# Patient Record
Sex: FEMALE | Race: WHITE | NOT HISPANIC OR LATINO | Employment: OTHER | URBAN - NONMETROPOLITAN AREA
[De-identification: names, ages, dates, MRNs, and addresses within clinical notes are randomized per-mention and may not be internally consistent; named-entity substitution may affect disease eponyms.]

---

## 2019-08-02 ENCOUNTER — OFFICE VISIT CONVERTED (OUTPATIENT)
Dept: FAMILY MEDICINE CLINIC | Age: 59
End: 2019-08-02
Attending: NURSE PRACTITIONER

## 2019-08-15 ENCOUNTER — HOSPITAL ENCOUNTER (OUTPATIENT)
Dept: OTHER | Facility: HOSPITAL | Age: 59
Discharge: HOME | End: 2019-08-15
Attending: NURSE PRACTITIONER

## 2019-08-15 LAB
ALBUMIN SERPL-MCNC: 4.3 G/DL (ref 3.5–5)
ALBUMIN/GLOB SERPL: 1.3 {RATIO} (ref 1.4–2.6)
ALP SERPL-CCNC: 97 U/L (ref 43–160)
ALT SERPL-CCNC: 23 U/L (ref 10–40)
ANION GAP SERPL CALC-SCNC: 17 MMOL/L (ref 8–19)
AST SERPL-CCNC: 24 U/L (ref 15–50)
BILIRUB SERPL-MCNC: 0.35 MG/DL (ref 0.2–1.3)
BUN SERPL-MCNC: 10 MG/DL (ref 5–25)
BUN/CREAT SERPL: 14 {RATIO} (ref 6–20)
CALCIUM SERPL-MCNC: 9.6 MG/DL (ref 8.7–10.4)
CHLORIDE SERPL-SCNC: 100 MMOL/L (ref 99–111)
CHOLEST SERPL-MCNC: 149 MG/DL (ref 107–200)
CHOLEST/HDLC SERPL: 2.9 {RATIO} (ref 3–6)
CONV CO2: 25 MMOL/L (ref 22–32)
CONV CREATININE URINE, RANDOM: 49.7 MG/DL (ref 10–300)
CONV MICROALBUM.,U,RANDOM: <12 MG/L (ref 0–20)
CONV TOTAL PROTEIN: 7.5 G/DL (ref 6.3–8.2)
CREAT UR-MCNC: 0.69 MG/DL (ref 0.5–0.9)
EST. AVERAGE GLUCOSE BLD GHB EST-MCNC: 131 MG/DL
GFR SERPLBLD BASED ON 1.73 SQ M-ARVRAT: >60 ML/MIN/{1.73_M2}
GLOBULIN UR ELPH-MCNC: 3.2 G/DL (ref 2–3.5)
GLUCOSE SERPL-MCNC: 128 MG/DL (ref 65–99)
HBA1C MFR BLD: 6.2 % (ref 3.5–5.7)
HDLC SERPL-MCNC: 51 MG/DL (ref 40–60)
LDLC SERPL CALC-MCNC: 80 MG/DL (ref 70–100)
MICROALBUMIN/CREAT UR: 24.1 MG/G{CRE} (ref 0–35)
OSMOLALITY SERPL CALC.SUM OF ELEC: 285 MOSM/KG (ref 273–304)
POTASSIUM SERPL-SCNC: 4.7 MMOL/L (ref 3.5–5.3)
SODIUM SERPL-SCNC: 137 MMOL/L (ref 135–147)
TRIGL SERPL-MCNC: 89 MG/DL (ref 40–150)
VLDLC SERPL-MCNC: 18 MG/DL (ref 5–37)

## 2020-01-30 ENCOUNTER — OFFICE VISIT CONVERTED (OUTPATIENT)
Dept: FAMILY MEDICINE CLINIC | Age: 60
End: 2020-01-30
Attending: NURSE PRACTITIONER

## 2020-02-28 ENCOUNTER — OFFICE VISIT CONVERTED (OUTPATIENT)
Dept: FAMILY MEDICINE CLINIC | Age: 60
End: 2020-02-28
Attending: NURSE PRACTITIONER

## 2020-08-26 ENCOUNTER — HOSPITAL ENCOUNTER (OUTPATIENT)
Dept: OTHER | Facility: HOSPITAL | Age: 60
Discharge: HOME | End: 2020-08-26
Attending: NURSE PRACTITIONER

## 2020-08-26 LAB
ALBUMIN SERPL-MCNC: 4.2 G/DL (ref 3.5–5)
ALBUMIN/GLOB SERPL: 1.1 {RATIO} (ref 1.4–2.6)
ALP SERPL-CCNC: 113 U/L (ref 43–160)
ALT SERPL-CCNC: 19 U/L (ref 10–40)
ANION GAP SERPL CALC-SCNC: 17 MMOL/L (ref 8–19)
AST SERPL-CCNC: 26 U/L (ref 15–50)
BASOPHILS # BLD MANUAL: 0.05 10*3/UL (ref 0–0.2)
BASOPHILS NFR BLD MANUAL: 0.6 % (ref 0–3)
BILIRUB SERPL-MCNC: 0.29 MG/DL (ref 0.2–1.3)
BUN SERPL-MCNC: 8 MG/DL (ref 5–25)
BUN/CREAT SERPL: 10 {RATIO} (ref 6–20)
CALCIUM SERPL-MCNC: 9.9 MG/DL (ref 8.7–10.4)
CHLORIDE SERPL-SCNC: 100 MMOL/L (ref 99–111)
CHOLEST SERPL-MCNC: 165 MG/DL (ref 107–200)
CHOLEST/HDLC SERPL: 3.9 {RATIO} (ref 3–6)
CONV CO2: 24 MMOL/L (ref 22–32)
CONV TOTAL PROTEIN: 8 G/DL (ref 6.3–8.2)
CREAT UR-MCNC: 0.77 MG/DL (ref 0.5–0.9)
DEPRECATED RDW RBC AUTO: 41.5 FL
EOSINOPHIL # BLD MANUAL: 0.26 10*3/UL (ref 0–0.7)
EOSINOPHIL NFR BLD MANUAL: 2.9 % (ref 0–7)
ERYTHROCYTE [DISTWIDTH] IN BLOOD BY AUTOMATED COUNT: 12.6 % (ref 11.5–14.5)
EST. AVERAGE GLUCOSE BLD GHB EST-MCNC: 148 MG/DL
GFR SERPLBLD BASED ON 1.73 SQ M-ARVRAT: >60 ML/MIN/{1.73_M2}
GLOBULIN UR ELPH-MCNC: 3.8 G/DL (ref 2–3.5)
GLUCOSE SERPL-MCNC: 127 MG/DL (ref 65–99)
GRANS (ABSOLUTE): 5.67 10*3/UL (ref 2–8)
GRANS: 63 % (ref 30–85)
HBA1C MFR BLD: 13.8 G/DL (ref 12–16)
HBA1C MFR BLD: 6.8 % (ref 3.5–5.7)
HCT VFR BLD AUTO: 41.8 % (ref 37–47)
HDLC SERPL-MCNC: 42 MG/DL (ref 40–60)
IMM GRANULOCYTES # BLD: 0.01 10*3/UL (ref 0–0.54)
IMM GRANULOCYTES NFR BLD: 0.1 % (ref 0–0.43)
LDLC SERPL CALC-MCNC: 97 MG/DL (ref 70–100)
LYMPHOCYTES # BLD MANUAL: 2.21 10*3/UL (ref 1–5)
LYMPHOCYTES NFR BLD MANUAL: 8.8 % (ref 3–10)
MCH RBC QN AUTO: 29.6 PG (ref 27–31)
MCHC RBC AUTO-ENTMCNC: 33 G/DL (ref 33–37)
MCV RBC AUTO: 89.5 FL (ref 81–99)
MONOCYTES # BLD AUTO: 0.79 10*3/UL (ref 0.2–1.2)
OSMOLALITY SERPL CALC.SUM OF ELEC: 282 MOSM/KG (ref 273–304)
PLATELET # BLD AUTO: 347 10*3/UL (ref 130–400)
PMV BLD AUTO: 10.4 FL (ref 7.4–10.4)
POTASSIUM SERPL-SCNC: 4.7 MMOL/L (ref 3.5–5.3)
RBC # BLD AUTO: 4.67 10*6/UL (ref 4.2–5.4)
SODIUM SERPL-SCNC: 136 MMOL/L (ref 135–147)
TRIGL SERPL-MCNC: 128 MG/DL (ref 40–150)
VARIANT LYMPHS NFR BLD MANUAL: 24.6 % (ref 20–45)
VLDLC SERPL-MCNC: 26 MG/DL (ref 5–37)
WBC # BLD AUTO: 8.99 10*3/UL (ref 4.8–10.8)

## 2020-08-28 ENCOUNTER — OFFICE VISIT CONVERTED (OUTPATIENT)
Dept: FAMILY MEDICINE CLINIC | Age: 60
End: 2020-08-28
Attending: NURSE PRACTITIONER

## 2021-03-04 ENCOUNTER — OFFICE VISIT CONVERTED (OUTPATIENT)
Dept: FAMILY MEDICINE CLINIC | Age: 61
End: 2021-03-04
Attending: NURSE PRACTITIONER

## 2021-05-18 NOTE — PROGRESS NOTES
Ellie Dubois  1960     Office/Outpatient Visit    Visit Date: Thu, Mar 4, 2021 09:09 am    Provider: Nae Mcghee N.P. (Assistant: Rosa Lucia MA)    Location: [facility]        Electronically signed by Nae Mcghee N.P. on  03/04/2021 09:55:36 AM                             Subjective:        CC: Ms. Dubois is a 60 year old White female.  Patient presents today for physical exam (not taking Loratadine);         HPI:           Ms. Dubois presents with encounter for general adult medical examination without abnormal findings.  Her last Pap smear no longer indicated due to history of hysterectomy.   Her last mammogram was in 3/14/16 and was normal.   She underwent in 10/12/2020 ColoGuard normal.   Preventative Health updated today.  She is not current with her influenza immunization.            PHQ-9 Depression Screening: Completed form scanned and in chart; Total Score 2 With regard to the type 2 diabetes, current meds include an oral hypoglycemic ( Glucophage XR), statin, and Ace inhibitor.  Most recent lab results include last HgbA1C 6.8 8/26/2020.  Reports fasting blood sugars on average running 100s. Reports UTD on eye exam. Will request copies. BP running 120s/80s.     In regard to the hyperlipidemia, current treatment includes Zocor.     Additionally, she presents with history of anxiety.  current treatment includes Citalopram.  Doing well. Denies suicidal ideation.    ROS:     CONSTITUTIONAL:  Negative for chills and fever.      EYES:  Negative for eye drainage and eye pain.      E/N/T:  Negative for ear pain and sore throat.      CARDIOVASCULAR:  Negative for chest pain and palpitations.      RESPIRATORY:  Negative for dyspnea and frequent wheezing.      GASTROINTESTINAL:  Negative for abdominal pain and vomiting.      MUSCULOSKELETAL:  Negative for joint stiffness and myalgias.      NEUROLOGICAL:  Negative for dizziness and headaches.      PSYCHIATRIC:  Positive for anxiety (  (stable on current medications) ).   Negative for suicidal thoughts.          Past Medical History / Family History / Social History:         Last Reviewed on 3/04/2021 09:31 AM by Nae Mcghee    Past Medical History:                 PAST MEDICAL HISTORY         Hyperlipidemia     Type 2 Diabetes     Shingles X 2     Allergies: seasonal;     Anxiety         GYNECOLOGICAL HISTORY:    G0         PREVENTIVE HEALTH MAINTENANCE             COLORECTAL CANCER SCREENING: Up to date (colonoscopy q10y; sigmoidoscopy q5y; Cologuard q3y) was last done 10/12/2020, Results are in chart; Cologuard normal     EYE EXAM: was last done 2020 [facility] [place]     Hepatitis C Medicare Screening: was last done 2019; Normal- old provider     MAMMOGRAM: was last done 3/14/16 with normal results declines, understands reason for testing     PAP SMEAR: No longer indicated due to age and history Hx hysterectomy         Surgical History:         Hysterectomy: ; total; Other Surgeries    vein ablation ;    wisdom teeth;         Family History:     Father:  at age 59; Cause of death was after CABG     Mother: Cause of death was PNA, Alzheimers         Social History:     Occupation:    Retired     Marital Status: Single         Tobacco/Alcohol/Supplements:     Last Reviewed on 3/04/2021 09:11 AM by Rosa Lucia    Tobacco: Current Smoker: She currently smokes every day, 1 pack per day.          Current Problems:     Anxiety disorder, unspecified    Type 2 diabetes mellitus without complications    Hyperlipidemia, unspecified    Encounter for screening for depression    Other specified disorders of Eustachian tube, bilateral    Essential (primary) hypertension    Rash and other nonspecific skin eruption    Encounter for general adult medical examination without abnormal findings    Acute sinusitis, unspecified    Encounter for screening for malignant neoplasm of colon    Encounter for other screening for malignant  neoplasm of breast    Nicotine dependence, unspecified, uncomplicated        Immunizations:     Td (adult), 5 Lf tetanus toxoid, preservative free, adsorbed 7/25/2015        Allergies:     Last Reviewed on 3/04/2021 09:11 AM by Rosa Lucia    Sulfonamides:          Current Medications:     Last Reviewed on 3/04/2021 09:12 AM by Rosa Lucia    citalopram 20 mg oral tablet [take 1 tablet (20 mg) by oral route once daily]    Simvastatin 20 mg oral tablet [Take one and a half tablet(s) by mouth daily]    metFORMIN 750 mg oral Tablet, Extended Release 24 hr [1 tab daily]    fluticasone propionate 50 mcg/actuation Intranasal Spray, Suspension [inhale 1 spray (50 mcg) in each nostril by intranasal route once daily]    loratadine 10 mg oral tablet [take 1 tablet (10 mg) by oral route once daily]    lisinopriL 20 mg oral tablet [take 1 tablet (20 mg) by oral route once daily]        Objective:        Vitals:         Historical:     8/28/2020  BP:   126/63 mm Hg ( (left arm, , sitting, );) 8/28/2020  P:   83bpm ( (left arm (BP Cuff), , sitting, );) 8/28/2020  Wt:   221lbs    Current: 3/4/2021 9:12:56 AM    Ht:  5 ft, 8 in;  Wt: 233.4 lbs;  BMI: 35.5T: 97.2 F (temporal);  BP: 145/54 mm Hg (left arm, sitting);  P: 56 bpm (left arm (BP Cuff), sitting);  sCr: 0.77 mg/dL;  GFR: 94.96        Repeat:     9:14:0 AM  BP:   142/48mm Hg (left arm, sitting, HR: 56)     Exams:     PHYSICAL EXAM:     GENERAL: well developed, well nourished;  no apparent distress;     EYES: PERRL, EOMI     E/N/T: EARS: external auditory canal normal;  bilateral TMs are normal;  NOSE: normal turbinates; no sinus tenderness; OROPHARYNX: oral mucosa is normal; posterior pharynx shows no exudate;     RESPIRATORY: normal respiratory rate and pattern with no distress; normal breath sounds with no rales, rhonchi, wheezes or rubs;     CARDIOVASCULAR: normal rate; rhythm is regular;     GASTROINTESTINAL: nontender; normal bowel sounds; no organomegaly;      MUSCULOSKELETAL: normal gait;     NEUROLOGIC: mental status: alert and oriented x 3; GROSSLY INTACT     PSYCHIATRIC: appropriate affect and demeanor; normal speech pattern;     Left foot exam    Protective sensation using Monofilament test: NORMAL sensation. Patient detects .07 grams of force which is considered normal.    Skin is intact without sores or ulcers    Right foot exam    Protective sensation using Monofilament test: NORMAL sensation. Patient detects .07 grams of force which is considered normal.    Skin is intact without sores or ulcers Bilateral varicosities - does not desire intervention        Assessment:         Z00.00   Encounter for general adult medical examination without abnormal findings       Z13.31   Encounter for screening for depression       E11.9   Type 2 diabetes mellitus without complications       E78.5   Hyperlipidemia, unspecified       F41.9   Anxiety disorder, unspecified       F17.200   Nicotine dependence, unspecified, uncomplicated       Z12.39   Encounter for other screening for malignant neoplasm of breast           ORDERS:         Meds Prescribed:       [Refilled] metFORMIN 750 mg oral Tablet, Extended Release 24 hr [1 tab daily], #90 (ninety) tablets, Refills: 1 (one)       [Refilled] lisinopriL 20 mg oral tablet [take 1 tablet (20 mg) by oral route once daily], #90 (ninety) tablets, Refills: 1 (one)       [Refilled] Simvastatin 20 mg oral tablet [Take one and a half tablet(s) by mouth daily], #135 (one hundred and thirty five) tablets, Refills: 1 (one)       [Refilled] citalopram 20 mg oral tablet [take 1 tablet (20 mg) by oral route once daily], #90 (ninety) tablets, Refills: 1 (one)         Radiology/Test Orders:       90621  Screening mammography bi 2-view inc CAD  (Send-Out)            3017F  Colorectal CA screen results documented and reviewed (PV)  (In-House)              Lab Orders:       10304  DIAB2 - H CMP A1C LIPID AND MICRO ALBUM CR RATIO:  [id number]  (Send-Out)            APPTO  Appointment need  (In-House)              Other Orders:         Depression screen negative  (In-House)            4004F  Pt scrnd tobacco use rcvd tobacco cessation talk  (In-House)            2028F  Foot examination performed (includes examination through visual inspection, sensory exam with monofilament, and pulse exam - report when any of the three components are completed) (DM)4  (In-House)            DLEYE  Dilated Eye Exam  (Send-Out)                      Plan:         Encounter for general adult medical examination without abnormal findingsUTD Td vaccine. Declines influenza, shingrix vaccine. UTD colorectal cancer with ColoGuard (normal). Due for mammogram. Pap smear no longer indicated due to hysterectomy.    LABORATORY:  Labs ordered to be performed today include Diabetes Panel 2;CMP, A1C, Lipid, Microalbumin:Creatinine Ratio.  MIPS Vaccines Flu and Pneumonia updated in Shot record Colorectal Cancer Screening is up to date and the results are in the chart     COUNSELING was provided today regarding the following topics: healthy eating habits, regular exercise, breast self-exam, and Advised to see dentist and eye doctor regularly.            Orders:       32578  DIAB2 - [facility] CMP A1C LIPID AND MICRO ALBUM CR RATIO: [id number]  (Send-Out)            3017F  Colorectal CA screen results documented and reviewed (PV)  (In-House)              Encounter for screening for depression    MIPS PHQ-9 Depression Screening: Completed form scanned and in chart; Total Score 2; Negative Depression Screen           Orders:         Depression screen negative  (In-House)              Type 2 diabetes mellitus without complications        RECOMMENDATIONS: instructed in use of glucometer ( check glucose daily at random intervals ), adherance to Low carb, NCS diet diet, annual monofilament test for evaluating sensation in feet, daily foot  self-inspection, annual eye exams, and need for yearly flu shots.      FOLLOW-UP: Schedule a follow-up visit in 6 months.            Prescriptions:       [Refilled] metFORMIN 750 mg oral Tablet, Extended Release 24 hr [1 tab daily], #90 (ninety) tablets, Refills: 1 (one)       [Refilled] lisinopriL 20 mg oral tablet [take 1 tablet (20 mg) by oral route once daily], #90 (ninety) tablets, Refills: 1 (one)           Orders:       DLEYE  Dilated Eye Exam  (Send-Out)            APPTO  Appointment need  (In-House)              Hyperlipidemia, unspecifiedChecking labs as above          Prescriptions:       [Refilled] Simvastatin 20 mg oral tablet [Take one and a half tablet(s) by mouth daily], #135 (one hundred and thirty five) tablets, Refills: 1 (one)         Anxiety disorder, unspecifiedStable          Prescriptions:       [Refilled] citalopram 20 mg oral tablet [take 1 tablet (20 mg) by oral route once daily], #90 (ninety) tablets, Refills: 1 (one)         Nicotine dependence, unspecified, uncomplicated        RECOMMENDATIONS given include: Counseled on smoking cessation and advised of the benefits to patient's health if she were to stop smoking. Counseling for less than 3 minutes.            Orders:       4004F  Pt scrnd tobacco use rcvd tobacco cessation talk  (In-House)              Encounter for other screening for malignant neoplasm of breast        RADIOLOGY:  I have ordered Mammogram Screening to be done today.      RECOMMENDATIONS given include: Further recommendation to be given after test results are complete.            Orders:       58084  Screening mammography bi 2-view inc CAD  (Send-Out)                  Other Orders      2028F  Foot examination performed (includes examination through visual inspection, sensory exam with monofilament, and pulse exam - report when any of the three components are completed) (DM)4  (In-House)              Patient Recommendations:        For  Encounter for general adult  medical examination without abnormal findings:        Limit dietary intake of fat (especially saturated fat) and cholesterol.  Eat a variety of foods, including plenty of fruits, vegetables, and grain containg fiber, limit fat intake to 30% of total calories. Balance caloric intake with energy expended.    Maintaining regular physical activity is advised to help prevent heart disease, hypertension, diabetes, and obesity.    You should regularly examine your breasts, easily done while in the shower or with lotion.  Feel and look for differences in consistency from month to month, especially noting knots or lumps, changes in skin appearance, nipple retraction or discharge.          For  Type 2 diabetes mellitus without complications:    Obtain instruction in the proper use of a home blood glucose monitor. Follow a diabetic diet as directed. Have an annual monofilament test to check for diabetes-related sensory nerve disturbance in the feet. Examine your feet daily.  Small cuts and injuries often go unnoticed and can lead to serious infections. Have an annual eye exam. Obtain a flu shot each year.  Schedule a follow-up visit in 6 months.          For  Nicotine dependence, unspecified, uncomplicated:        Stop smoking.              Charge Capture:         Primary Diagnosis:     Z00.00  Encounter for general adult medical examination without abnormal findings           Orders:      26119  Preventive medicine, established patient, age 40-64 years  (In-House)            3017F  Colorectal CA screen results documented and reviewed (PV)  (In-House)              Z13.31  Encounter for screening for depression           Orders:      40336-50  Office/outpatient visit; established patient, level 3  (In-House)              Depression screen negative  (In-House)              E11.9  Type 2 diabetes mellitus without complications           Orders:      APPTO  Appointment need  (In-House)              E78.5  Hyperlipidemia,  unspecified     F41.9  Anxiety disorder, unspecified     F17.200  Nicotine dependence, unspecified, uncomplicated           Orders:      4004F  Pt scrnd tobacco use rcvd tobacco cessation talk  (In-House)              Z12.39  Encounter for other screening for malignant neoplasm of breast         Other Orders:       2028F  Foot examination performed (includes examination through visual inspection, sensory exam with monofilament, and pulse exam - report when any of the three components are completed) (DM)4  (In-House)

## 2021-05-18 NOTE — PROGRESS NOTES
"Ellie Dubois 1960     Office/Outpatient Visit    Visit Date: Fri, Aug 2, 2019 12:40 pm    Provider: Nae Mcghee N.P. (Assistant: Rosa Lucia MA)    Location: [facility]        Electronically signed by Nae Mcghee N.P. on  08/02/2019 03:43:39 PM                             SUBJECTIVE:        CC:     Ms. Dubois is a 59 year old female.  Patient presents today for new patient establishment, also has complaints of rash on lower bilateral legs X 1.5 months;         HPI:         With regard to the type 2 diabetes, current meds include an oral hypoglycemic ( Glucophage XR ( 750 mg q d ) ).  Most recent lab results include Reports last HgbA1C 6.1 1/2019..  Has lost 60 pounds since her diagnosis.          In regard to the hyperlipidemia, current treatment includes Zocor.  Most recent lab tests include \"NORMAL\".          Additionally, she presents with history of anxiety.  current treatment includes Citalopram.  Doing well. Denies suicidal ideation.          Rash details; this has been a problem for the past 4 months.  It is located primarily over the left leg and the right leg.  She describes the rash as red.  The rash is pruritic and burning.  Used Lotrimin and that helped but came back 1 months ago. Also initially used OTC cream for psoriasis without improvement.      ROS:     CONSTITUTIONAL:  Negative for chills and fever.      EYES:  Negative for eye drainage and eye pain.      E/N/T:  Negative for ear pain and sore throat.      CARDIOVASCULAR:  Negative for chest pain and palpitations.      RESPIRATORY:  Negative for dyspnea and frequent wheezing.      GASTROINTESTINAL:  Negative for abdominal pain and vomiting.      INTEGUMENTARY/BREAST:  Positive for rash.      NEUROLOGICAL:  Negative for dizziness and headaches.      PSYCHIATRIC:  Negative for depression and suicidal thoughts.          PMH/FMH/SH:     Last Reviewed on 8/02/2019 01:12 PM by Nae Mcghee    Past Medical History:                "  PAST MEDICAL HISTORY         Hyperlipidemia     Type 2 Diabetes     Shingles X 2     Allergies: seasonal;     Anxiety         GYNECOLOGICAL HISTORY:    G0         PREVENTIVE HEALTH MAINTENANCE             COLORECTAL CANCER SCREENING: declines colorectal cancer screening, understands reason for testing     EYE EXAM: was last done      MAMMOGRAM: was last done 2017 with normal results [place]     PAP SMEAR: No longer indicated due to age and history Hx hysterectomy         Surgical History:         Hysterectomy: ; total; Other Surgeries    vein ablation ;    wisdom teeth;         Family History:     Father:  at age 59; Cause of death was after CABG     Mother: Cause of death was PNA, Alzheimers         Social History:     Occupation: [employer]     Marital Status: Single         Tobacco/Alcohol/Supplements:     Last Reviewed on 2019 12:44 PM by Rosa Lucia    Tobacco: Current Smoker: She currently smokes every day, 1 pack per day.              Current Problems:     Anxiety     Hyperlipidemia     Type 2 diabetes     Rash     Cigarette smoking     Screening for depression         Immunizations:     None        Allergies:     Last Reviewed on 2019 12:44 PM by Rosa Lucia    Sulfonamides:        Current Medications:     Last Reviewed on 2019 12:44 PM by Rosa Lucia    Simvastatin 20mg Tablet Take one and a half tablet(s) by mouth daily     Citalopram Hydrobromide 20mg Tablet 1 pill daily     Metformin HCl 750mg Tablets, Extended Release 1 tab daily         OBJECTIVE:        Vitals:         Current: 2019 12:45:51 PM    Ht:  5 ft, 8 in;  Wt: 222.8 lbs;  BMI: 33.9    T: 98.8 F (oral);  BP: 142/48 mm Hg (left arm, sitting);  P: 72 bpm (left arm (BP Cuff), sitting)        Repeat:     12:46:30 PM     BP:   138/49mm Hg (left arm, sitting)         Exams:     PHYSICAL EXAM:     GENERAL: well developed, well nourished;  no apparent distress;     RESPIRATORY: normal  respiratory rate and pattern with no distress; normal breath sounds with no rales, rhonchi, wheezes or rubs;     CARDIOVASCULAR: normal rate; rhythm is regular;     GASTROINTESTINAL: nontender; normal bowel sounds; no organomegaly;     BREAST/INTEGUMENT: SKIN: no significant rashes or lesions; no suspicious moles; a rash is noted on the legs;  the color is mainly red;  it is best characterized as maculopapular and excoriated;     MUSCULOSKELETAL: normal gait; normal range of motion of all major muscle groups;     NEUROLOGIC: mental status: alert and oriented x 3; GROSSLY INTACT     PSYCHIATRIC: appropriate affect and demeanor;         ASSESSMENT           250.00   E11.9  Type 2 diabetes              DDx:     272.4   E78.5  Hyperlipidemia              DDx:     300.02   F41.9  Anxiety              DDx:     782.1   R21  Rash              DDx:     V79.0   Z13.31  Screening for depression              DDx:     305.1   F17.200  Cigarette smoking              DDx:         ORDERS:         Meds Prescribed:       Clotrimazole 1% Cream APPLY  TO AREA BID  #45 (Forty Five) gm Refills: 1       Diflucan (Fluconazole) 150mg Tablet Take 1 tablet(s) by mouth on day #1, then 1 tablet on day #3  #2 (Two) tablet(s) Refills: 0       Doxycycline Monohydrate 100mg Capsules Take 1 capsule(s) by mouth bid x10 days  #20 (Twenty) capsule(s) Refills: 0       Refill of: Metformin HCl 750mg Tablets, Extended Release 1 tab daily  #90 (Ninety) tablet(s) Refills: 1       Refill of: Simvastatin 20mg Tablet Take one and a half tablet(s) by mouth daily  #135 (One [place] and Thirty Five) tablet(s) Refills: 1       Refill of: Citalopram Hydrobromide 20mg Tablet 1 pill daily  #90 (Ninety) tablet(s) Refills: 1         Lab Orders:       [id number]  DIAB2 - [facility] CMP A1C LIPID AND MICRO ALBUM CR RATIO: [id number]  (Send-Out)         APPTO  Appointment need  (In-House)           Other Orders:       4004F  Pt scrnd tobacco use rcvd tobacco  cessation talk  (In-House)           Depression screen negative  (In-House)           Negative EtOH screen  (In-House)         DLEYE  Dilated Eye Exam  (Send-Out)                   PLAN: Declines mammogram, colonoscopy, pap smear. Understands reason for testing. Due for eye exam.          Type 2 diabetes     LABORATORY:  Labs ordered to be performed today include Diabetes Panel 2;CMP, A1C, Lipid, Microalbumin:Creatinine Ratio.  MIPS Vaccines Flu and Pneumonia updated in Shot record     FOLLOW-UP: Schedule a follow-up visit in 6 months.      RECOMMENDATIONS: instructed in use of glucometer ( check glucose daily at random intervals ), adherance to Low carb, NCS diet diet, annual monofilament test for evaluating sensation in feet, daily foot self-inspection, annual eye exams, and need for yearly flu shots.            Prescriptions:       Refill of: Metformin HCl 750mg Tablets, Extended Release 1 tab daily  #90 (Ninety) tablet(s) Refills: 1           Orders:       [id number]  DIAB - [facility] CMP A1C LIPID AND MICRO ALBUM CR RATIO: [id number]  (Send-Out)         APPTO  Appointment need  (In-House)         DLEYE  Dilated Eye Exam  (Send-Out)            Hyperlipidemia Low cholesterol diet. Checking labs as above.           Prescriptions:       Refill of: Simvastatin 20mg Tablet Take one and a half tablet(s) by mouth daily  #135 (One [place] and Thirty Five) tablet(s) Refills: 1          Anxiety Stable.           Prescriptions:       Refill of: Citalopram Hydrobromide 20mg Tablet 1 pill daily  #90 (Ninety) tablet(s) Refills: 1          Rash Advised do not take Simvastatin while taking Diflucan. Avoid scratching. Follow up if no improvement.           Prescriptions:       Clotrimazole 1% Cream APPLY  TO AREA BID  #45 (Forty Five) gm Refills: 1       Diflucan (Fluconazole) 150mg Tablet Take 1 tablet(s) by mouth on day #1, then 1 tablet on day #3  #2 (Two) tablet(s) Refills: 0       Doxycycline Monohydrate  100mg Capsules Take 1 capsule(s) by mouth bid x10 days  #20 (Twenty) capsule(s) Refills: 0          Screening for depression     MIPS PHQ-9 Depression Screening: Completed form scanned and in chart; Total Score 1; Negative Depression Screen Negative alcohol screen           Orders:         Depression screen negative  (In-House)           Negative EtOH screen  (In-House)            Cigarette smoking         RECOMMENDATIONS given include: Counseled on smoking cessation and advised of the benefits to patient's health if she were to stop smoking. Counseling for less than 3 minutes.            Orders:       4004F  Pt scrnd tobacco use rcvd tobacco cessation talk  (In-House)               Patient Recommendations:        For  Type 2 diabetes:     Schedule a follow-up visit in 6 months.  Obtain instruction in the proper use of a home blood glucose monitor. Follow a diabetic diet as directed. Have an annual monofilament test to check for diabetes-related sensory nerve disturbance in the feet. Examine your feet daily.  Small cuts and injuries often go unnoticed and can lead to serious infections. Have an annual eye exam. Obtain a flu shot each year.          For  Cigarette smoking:         Stop smoking.              CHARGE CAPTURE           **Please note: ICD descriptions below are intended for billing purposes only and may not represent clinical diagnoses**        Primary Diagnosis:         250.00 Type 2 diabetes            E11.9    Type 2 diabetes mellitus without complications              Orders:          [id number]   Office visit - new pt, level 3  (In-House)             APPTO   Appointment need  (In-House)           272.4 Hyperlipidemia            E78.5    Hyperlipidemia, unspecified    300.02 Anxiety            F41.9    Anxiety disorder, unspecified    782.1 Rash            R21    Rash and other nonspecific skin eruption    V79.0 Screening for depression            Z13.31    Encounter for screening for  depression              Orders:             Depression screen negative  (In-House)                Negative EtOH screen  (In-House)           305.1 Cigarette smoking            F17.200    Nicotine dependence, unspecified, uncomplicated              Orders:          4004F   Pt scrnd tobacco use rcvd tobacco cessation talk  (In-House)

## 2021-05-18 NOTE — PROGRESS NOTES
Ellie Dubois  1960     Office/Outpatient Visit    Visit Date: Thu, Jan 30, 2020 03:00 pm    Provider: Nae Mcghee N.P. (Assistant: Rosa Lucia MA)    Location: [facility]        Electronically signed by Nae Mcghee N.P. on  02/03/2020 02:36:42 PM                             Subjective:        CC: Ms. Dubois is a 59 year old White female.  presents today due to complaints of cough, congestion and drainage X 1 month (states she is taking Citalopram, dosage unknown)         HPI:           Additionally, she presents with history of acute upper respiratory infection, unspecified.  the symptoms include dizziness, runny nose and post nasal drip.  She has not tried any medications for symptomatic relief.  Medical history is significant for diabetes.  Rash is located primarily over the left leg and the right leg.  She describes the rash as red.  The rash is pruritic and burning.  Used Lotrimin and that helped but symptoms returned. Also initially used OTC cream for psoriasis without improvement.  Was seen in August. Prescribed Clotrimazole and Doxycyline with some improvement.    ROS:     CONSTITUTIONAL:  Negative for chills and fever.      E/N/T:  Positive for ear pain, frequent rhinorrhea and post nasal drip.      CARDIOVASCULAR:  Negative for chest pain and palpitations.      RESPIRATORY:  Negative for dyspnea and frequent wheezing.      GASTROINTESTINAL:  Negative for abdominal pain and vomiting.      MUSCULOSKELETAL:  Negative for joint stiffness and myalgias.      INTEGUMENTARY/BREAST:  Positive for rash.      NEUROLOGICAL:  Negative for dizziness and headaches.      PSYCHIATRIC:  Negative for depression and suicidal thoughts.          Past Medical History / Family History / Social History:         Last Reviewed on 8/02/2019 01:12 PM by Nae Mcghee    Past Medical History:                 PAST MEDICAL HISTORY         Hyperlipidemia     Type 2 Diabetes     Shingles X 2     Allergies:  seasonal;     Anxiety         GYNECOLOGICAL HISTORY:    G0         PREVENTIVE HEALTH MAINTENANCE             COLORECTAL CANCER SCREENING: declines colorectal cancer screening, understands reason for testing     EYE EXAM: was last done      Hepatitis C Medicare Screening: was last done 2019; Normal- old provider     MAMMOGRAM: was last done 3/14/16 with normal results [place]     PAP SMEAR: No longer indicated due to age and history Hx hysterectomy         Surgical History:         Hysterectomy: ; total; Other Surgeries    vein ablation ;    wisdom teeth;         Family History:     Father:  at age 59; Cause of death was after CABG     Mother: Cause of death was PNA, Alzheimers         Social History:     Occupation: [employer]     Marital Status: Single         Tobacco/Alcohol/Supplements:     Last Reviewed on 2019 12:44 PM by Rosa Lucia    Tobacco: Current Smoker: She currently smokes every day, 1 pack per day.          Current Problems:     Type 2 diabetes mellitus without complications    Hyperlipidemia, unspecified    Anxiety disorder, unspecified    Type 2 diabetes    Hyperlipidemia    Anxiety        Immunizations:     None        Allergies:     Last Reviewed on 2019 12:44 PM by Rosa Lucia    Sulfonamides:          Current Medications:     Last Reviewed on 2019 12:44 PM by Rosa Lucia    Simvastatin 20mg Tablet [Take one and a half tablet(s) by mouth daily]        Objective:        Vitals:         Historical:     2019  BP:   138/49 mm Hg ( (left arm, , sitting, );) 2019  P:   72bpm ( (left arm (BP Cuff), , sitting, );) 2019  Wt:   222.8lbs    Current: 2020 3:10:16 PM    Ht:  5 ft, 8 in;  Wt: 229.8 lbs;  BMI: 34.9T: 98.4 F (oral);  BP: 164/67 mm Hg (left arm, sitting);  P: 78 bpm (left arm (BP Cuff), sitting);  sCr: 0.69 mg/dL;  GFR: 106.58        Repeat:     3:10:27 PM  BP:   153/69mm Hg (left arm, sitting, HR: 79)     Exams:      PHYSICAL EXAM:     GENERAL: well developed, well nourished;  no apparent distress;     EYES: PERRL, EOMI     E/N/T: EARS: external auditory canal normal;  bilateral TMs are normal;  NOSE: normal turbinates; no sinus tenderness; OROPHARYNX: oral mucosa is normal; posterior pharynx shows no exudate;     NECK: range of motion is normal; trachea is midline;     RESPIRATORY: normal respiratory rate and pattern with no distress; normal breath sounds with no rales, rhonchi, wheezes or rubs;     CARDIOVASCULAR: normal rate; rhythm is regular;     BREAST/INTEGUMENT: SKIN: no significant rashes or lesions; no suspicious moles; a rash is noted on the legs;  the color is mainly red;  it is best characterized as maculopapular and excoriated;     MUSCULOSKELETAL: normal gait; normal range of motion of all major muscle groups;     NEUROLOGIC: mental status: alert and oriented x 3; GROSSLY INTACT     PSYCHIATRIC: appropriate affect and demeanor;         Assessment:         H69.83   Other specified disorders of Eustachian tube, bilateral       I10   Essential (primary) hypertension       R21   Rash and other nonspecific skin eruption       Z13.31   Encounter for screening for depression           ORDERS:         Meds Prescribed:       [New Rx] fluticasone propionate 50 mcg/actuation Intranasal Spray, Suspension [inhale 1 spray (50 mcg) in each nostril by intranasal route once daily], #16 (sixteen) milliliters, Refills: 5 (five)       [New Rx] loratadine 10 mg oral tablet [take 1 tablet (10 mg) by oral route once daily], #30 (thirty) tablets, Refills: 5 (five)       [New Rx] predniSONE 10 mg oral tablet [take 3 tablets by oral route once daily for 5 days], #15 (fifteen) tablets, Refills: 0 (zero)       [New Rx] lisinopriL 10 mg oral tablet [take 1 tablet (10 mg) by oral route once daily], #30 (thirty) tablets, Refills: 1 (one)       [New Rx] doxycycline hyclate 100 mg oral tablet [take 1 tablet (100 mg) by oral route 2 times per day  for 10 days], #20 (twenty) tablets, Refills: 0 (zero)       [Refilled] Clotrimazole 1 % Topical Cream [APPLY  TO AREA BID], #45 (forty five) grams, Refills: 1 (one)         Other Orders:         Depression screen negative  (In-House)                      Plan:         Other specified disorders of Eustachian tube, bilateral        RECOMMENDATIONS given include: Push Fluids, Rest, Follow up if no improvement or worsening symptoms like high fevers, vomiting, weakness, or increasing shortness of air.    .            Prescriptions:       [New Rx] fluticasone propionate 50 mcg/actuation Intranasal Spray, Suspension [inhale 1 spray (50 mcg) in each nostril by intranasal route once daily], #16 (sixteen) milliliters, Refills: 5 (five)       [New Rx] loratadine 10 mg oral tablet [take 1 tablet (10 mg) by oral route once daily], #30 (thirty) tablets, Refills: 5 (five)       [New Rx] predniSONE 10 mg oral tablet [take 3 tablets by oral route once daily for 5 days], #15 (fifteen) tablets, Refills: 0 (zero)         Essential (primary) hypertensionWill add Lisinopril. BP log. Low salt diet. F/U one month.           Prescriptions:       [New Rx] lisinopriL 10 mg oral tablet [take 1 tablet (10 mg) by oral route once daily], #30 (thirty) tablets, Refills: 1 (one)         Rash and other nonspecific skin eruptionConsider additional pvd testing if no improvement.          Prescriptions:       [New Rx] doxycycline hyclate 100 mg oral tablet [take 1 tablet (100 mg) by oral route 2 times per day for 10 days], #20 (twenty) tablets, Refills: 0 (zero)       [Refilled] Clotrimazole 1 % Topical Cream [APPLY  TO AREA BID], #45 (forty five) grams, Refills: 1 (one)         Encounter for screening for depression    MIPS PHQ-9 Depression Screening: Completed form scanned and in chart; Total Score 4; Negative Depression Screen           Orders:         Depression screen negative  (In-House)                  Charge Capture:         Primary  Diagnosis:     H69.83  Other specified disorders of Eustachian tube, bilateral           Orders:      [id number]  Office/outpatient visit; established patient, level 4  (In-House)              I10  Essential (primary) hypertension     R21  Rash and other nonspecific skin eruption     Z13.31  Encounter for screening for depression           Orders:        Depression screen negative  (In-House)

## 2021-05-18 NOTE — PROGRESS NOTES
Ellie Dubois  1960     Office/Outpatient Visit    Visit Date: Fri, Feb 28, 2020 02:43 pm    Provider: Nae Mcghee N.P. (Assistant: Rosa Lucia MA)    Location: [facility]        Electronically signed by Nae Mcghee N.P. on  03/03/2020 10:56:31 AM                             Subjective:        CC: Ms. Dubois is a 59 year old White female.  Patient presents today for physical exam;         HPI: With regard to the type 2 diabetes, current meds include an oral hypoglycemic ( Glucophage XR), statin, and Ace inhibitor.  Most recent lab results include last HgbA1C 6.2 8/2019.   Has lost 60 pounds since her initial diagnosis. Does not check blood sugar on regular basis. Last eye exam 2018. Plans to make appt here in [place] soon.         In regard to the hyperlipidemia, current treatment includes Zocor.         Additionally, she presents with history of anxiety.  current treatment includes Citalopram.  Doing well. Denies suicidal ideation.        Also still with upper respiratory symptoms including runny nose, ear symptoms. Has taken 2 rounds of steroids. Also using Flonase, warm compresses. Now feels that sinus pressure has increased.          Patient to be evaluated for encounter for general adult medical examination without abnormal findings.  Her last mammogram was in 3/14/16 and was normal.   She has never had a flexible sigmoidoscopy or colonoscopy. Preventative Health updated today.  She is not current with her influenza immunization.      ROS:     CONSTITUTIONAL:  Negative for chills and fever.      EYES:  Negative for eye drainage and eye pain.      E/N/T:  Positive for ear pain, frequent rhinorrhea and sinus pressure.      CARDIOVASCULAR:  Negative for chest pain and palpitations.      RESPIRATORY:  Negative for dyspnea and frequent wheezing.      GASTROINTESTINAL:  Negative for abdominal pain and vomiting.      MUSCULOSKELETAL:  Negative for joint stiffness and myalgias.       INTEGUMENTARY/BREAST:  Negative for rash.      NEUROLOGICAL:  Negative for dizziness and headaches.      PSYCHIATRIC:  Negative for depression and suicidal thoughts.          Past Medical History / Family History / Social History:         Last Reviewed on 2019 01:12 PM by Nae Mcghee    Past Medical History:                 PAST MEDICAL HISTORY         Hyperlipidemia     Type 2 Diabetes     Shingles X 2     Allergies: seasonal;     Anxiety         GYNECOLOGICAL HISTORY:    G0         PREVENTIVE HEALTH MAINTENANCE             COLORECTAL CANCER SCREENING: declines colorectal cancer screening, understands reason for testing     EYE EXAM: was last done      Hepatitis C Medicare Screening: was last done 2019; Normal- old provider     MAMMOGRAM: was last done 3/14/16 with normal results [place]     PAP SMEAR: No longer indicated due to age and history Hx hysterectomy         Surgical History:         Hysterectomy: ; total; Other Surgeries    vein ablation ;    wisdom teeth;         Family History:     Father:  at age 59; Cause of death was after CABG     Mother: Cause of death was PNA, Alzheimers         Social History:     Occupation: [employer]     Marital Status: Single         Tobacco/Alcohol/Supplements:     Last Reviewed on 2020 03:08 PM by Rosa Lucia    Tobacco: Current Smoker: She currently smokes every day, 1 pack per day.          Current Problems:     Type 2 diabetes mellitus without complications    Hyperlipidemia, unspecified    Anxiety disorder, unspecified    Type 2 diabetes    Hyperlipidemia    Anxiety    Other specified disorders of Eustachian tube, bilateral    Essential (primary) hypertension    Rash and other nonspecific skin eruption    Encounter for screening for depression        Immunizations:     None        Allergies:     Last Reviewed on 2020 03:08 PM by Rosa Lucia    Sulfonamides:          Current Medications:     Last Reviewed on  1/30/2020 03:08 PM by Rosa Lucia    metFORMIN 750 mg oral Tablet, Extended Release 24 hr [take 1 tablet (750 mg) by oral route once daily with the evening meal]    citalopram     Simvastatin 20 mg oral tablet [Take one and a half tablet(s) by mouth daily]    Clotrimazole 1 % Topical Cream [APPLY  TO AREA BID]    fluticasone propionate 50 mcg/actuation Intranasal Spray, Suspension [inhale 1 spray (50 mcg) in each nostril by intranasal route once daily]    loratadine 10 mg oral tablet [take 1 tablet (10 mg) by oral route once daily]    predniSONE 10 mg oral tablet [take 3 tablets by oral route once daily for 5 days]    lisinopriL 10 mg oral tablet [take 1 tablet (10 mg) by oral route once daily]    doxycycline hyclate 100 mg oral tablet [take 1 tablet (100 mg) by oral route 2 times per day for 10 days]    predniSONE 10 mg oral tablet [take 3 tablets by oral route once daily for 3 days]        Objective:        Vitals:         Historical:     1/30/2020  BP:   153/69 mm Hg ( (left arm, , sitting, );) 8/2/2019  BP:   138/49 mm Hg ( (left arm, , sitting, );) 1/30/2020  P:   78bpm ( (left arm (BP Cuff), , sitting, );) 8/2/2019  P:   72bpm ( (left arm (BP Cuff), , sitting, );) 1/30/2020  Wt:   229.8lbs    Current: 2/28/2020 2:48:10 PM    Ht:  5 ft, 8 in;  Wt: 227 lbs;  BMI: 34.5T: 97.7 F (oral);  BP: 172/73 mm Hg (left arm, sitting);  P: 63 bpm (left arm (BP Cuff), sitting);  sCr: 0.69 mg/dL;  GFR: 106.03        Repeat:     2:49:5 PM  BP:   174/77mm Hg (right arm, sitting, HR: 65)     Exams:     PHYSICAL EXAM:     GENERAL: well developed, well nourished;  no apparent distress;     EYES: PERRL, EOMI     E/N/T: EARS: external auditory canal normal;  both TMs are dull;  NOSE: normal turbinates; bilateral maxillary and bilateral frontal sinus tenderness present; OROPHARYNX: oral mucosa is normal; posterior pharynx shows no exudate and post nasal drip;     NECK: range of motion is normal; trachea is midline;      RESPIRATORY: normal appearance and symmetric expansion of chest wall; normal respiratory rate and pattern with no distress; normal breath sounds with no rales, rhonchi, wheezes or rubs;     CARDIOVASCULAR: normal rate; rhythm is regular;     GASTROINTESTINAL: nontender; normal bowel sounds; no organomegaly;     BREAST/INTEGUMENT: SKIN: no significant rashes or lesions; no suspicious moles;     MUSCULOSKELETAL: normal gait; normal range of motion of all major muscle groups; no limb or joint pain with range of motion;     NEUROLOGIC: mental status: alert and oriented x 3; GROSSLY INTACT     PSYCHIATRIC: appropriate affect and demeanor; normal thought and perception;         Assessment:         Z00.00   Encounter for general adult medical examination without abnormal findings       E11.9   Type 2 diabetes mellitus without complications       E78.5   Hyperlipidemia, unspecified       I10   Essential (primary) hypertension       F41.9   Anxiety disorder, unspecified       J01.90   Acute sinusitis, unspecified       Z13.31   Encounter for screening for depression           ORDERS:         Meds Prescribed:       [New Rx] lisinopriL 20 mg oral tablet [take 1 tablet (20 mg) by oral route once daily], #90 (ninety) tablets, Refills: 1 (one)       [Refilled] Simvastatin 20 mg oral tablet [Take one and a half tablet(s) by mouth daily], #135 (one hundred and thirty five) tablets, Refills: 1 (one)       [Refilled] citalopram 20 mg oral tablet [take 1 tablet (20 mg) by oral route once daily], #90 (ninety) tablets, Refills: 1 (one)       [Refilled] metFORMIN 750 mg oral Tablet, Extended Release 24 hr [1 tab daily], #90 (ninety) tablets, Refills: 1 (one)       [New Rx] amoxicillin 875 mg oral tablet [take 1 tablet (875 mg) by oral route every 12 hours X 10 days], #20 (twenty) tablets, Refills: 0 (zero)         Lab Orders:       [id number]  [facility] CBC with 3 part diff  (Send-Out)            05931  COMP - [facility] Comp. Metabolic Panel   (Send-Out)            38763  A1CEG - [facility] Hemoglobin A1C  (Send-Out)            10269  LPDP [facility] Lipid Panel  (Send-Out)            APPTO  Appointment need  (In-House)              Other Orders:         Depression screen negative  (In-House)            DLEYE  Dilated Eye Exam  (Send-Out)                      Plan:         Encounter for general adult medical examination without abnormal findingsDeclines mammogram, colonoscopy, influenza, pneumonia vaccien. Understands reason for testing/recommendation    LABORATORY:  Labs ordered to be performed today include CBC, Comprehensive metabolic panel, HgbA1C, and lipid panel.      COUNSELING was provided today regarding the following topics: healthy eating habits, regular exercise, and Advised to see eye doctor and dentist regularly.            Orders:       [id number]  BDCBC - [facility] CBC with 3 part diff  (Send-Out)            83470  COMP - [facility] Comp. Metabolic Panel  (Send-Out)            [facility]  [facility]            06246  LPDP - [facility] Lipid Panel  (Send-Out)              Type 2 diabetes mellitus without complicationsDue for eye exam. Plans to schedule        FOLLOW-UP: Schedule a follow-up visit in 6 months.      RECOMMENDATIONS: instructed in use of glucometer ( check glucose daily at random intervals ), adherance to Low carb, NCS diet diet, annual monofilament test for evaluating sensation in feet, daily foot self-inspection, annual eye exams, and need for yearly flu shots.            Prescriptions:       [Refilled] metFORMIN 750 mg oral Tablet, Extended Release 24 hr [1 tab daily], #90 (ninety) tablets, Refills: 1 (one)           Orders:       APPTO  Appointment need  (In-House)            DLEYE  Dilated Eye Exam  (Send-Out)              Hyperlipidemia, unspecified        RECOMMENDATIONS given include: low cholesterol/low fat diet.            Prescriptions:       [Refilled] Simvastatin 20 mg oral tablet [Take one and a half tablet(s) by mouth  daily], #135 (one hundred and thirty five) tablets, Refills: 1 (one)         Essential (primary) hypertensionWill increase Lisinopril to 20 mg daily. BP log. Low salt diet.          Prescriptions:       [New Rx] lisinopriL 20 mg oral tablet [take 1 tablet (20 mg) by oral route once daily], #90 (ninety) tablets, Refills: 1 (one)         Anxiety disorder, unspecified          Prescriptions:       [Refilled] citalopram 20 mg oral tablet [take 1 tablet (20 mg) by oral route once daily], #90 (ninety) tablets, Refills: 1 (one)         Acute sinusitis, unspecified        RECOMMENDATIONS given include: Push Fluids, Rest, Follow up if no improvement or worsening symptoms like high fevers, vomiting, weakness, or increasing shortness of air.    .            Prescriptions:       [New Rx] amoxicillin 875 mg oral tablet [take 1 tablet (875 mg) by oral route every 12 hours X 10 days], #20 (twenty) tablets, Refills: 0 (zero)         Encounter for screening for depression    MIPS PHQ-9 Depression Screening: Completed form scanned and in chart; Total Score 1; Negative Depression Screen           Orders:         Depression screen negative  (In-House)                  Patient Recommendations:        For  Encounter for general adult medical examination without abnormal findings:        Limit dietary intake of fat (especially saturated fat) and cholesterol.  Eat a variety of foods, including plenty of fruits, vegetables, and grain containg fiber, limit fat intake to 30% of total calories. Balance caloric intake with energy expended.    Maintaining regular physical activity is advised to help prevent heart disease, hypertension, diabetes, and obesity.          For  Type 2 diabetes mellitus without complications:    Schedule a follow-up visit in 6 months.  Obtain instruction in the proper use of a home blood glucose monitor. Follow a diabetic diet as directed. Have an annual monofilament test to check for diabetes-related sensory  nerve disturbance in the feet. Examine your feet daily.  Small cuts and injuries often go unnoticed and can lead to serious infections. Have an annual eye exam. Obtain a flu shot each year.          For  Hyperlipidemia, unspecified:    Reduce the amount of cholesterol and saturated fat in your diet.              Charge Capture:         Primary Diagnosis:     Z00.00  Encounter for general adult medical examination without abnormal findings           Orders:      [id number]  Preventive medicine, established patient, age 40-64 years  (In-House)              E11.9  Type 2 diabetes mellitus without complications           Orders:      84223-13  Office/outpatient visit; established patient, level 4  (In-House)            APPTO  Appointment need  (In-House)              E78.5  Hyperlipidemia, unspecified     I10  Essential (primary) hypertension     F41.9  Anxiety disorder, unspecified     J01.90  Acute sinusitis, unspecified     Z13.31  Encounter for screening for depression           Orders:        Depression screen negative  (In-House)

## 2021-05-18 NOTE — PROGRESS NOTES
Ellie Dubois  1960     Office/Outpatient Visit    Visit Date: Fri, Aug 28, 2020 12:58 pm    Provider: Nae Mcghee N.P. (Assistant: Madan Corral, )    Location: [facility]        Electronically signed by Nae Mcghee N.P. on  08/28/2020 01:51:53 PM                             Subjective:        CC: Ms. Dubois is a 60 year old White female.  med refills;         HPI: With regard to the type 2 diabetes, current meds include an oral hypoglycemic ( Glucophage XR), statin, and Ace inhibitor.  Most recent lab results include last HgbA1C 6.8 8/26/2020.   Has lost 60 pounds since her initial diagnosis. Does not check blood sugar on regular basis. Last eye exam 2018. Plans to make appt soon.         In regard to the hyperlipidemia, current treatment includes Zocor.         Additionally, she presents with history of anxiety.  current treatment includes Citalopram.  Doing well. Denies suicidal ideation.    ROS:     CONSTITUTIONAL:  Negative for chills and fever.      CARDIOVASCULAR:  Negative for chest pain and palpitations.      RESPIRATORY:  Negative for dyspnea and frequent wheezing.      GASTROINTESTINAL:  Negative for abdominal pain and vomiting.      NEUROLOGICAL:  Negative for dizziness and headaches.      PSYCHIATRIC:  Negative for depression and suicidal thoughts.          Past Medical History / Family History / Social History:         Last Reviewed on 8/02/2019 01:12 PM by Nae Mcghee    Past Medical History:                 PAST MEDICAL HISTORY         Hyperlipidemia     Type 2 Diabetes     Shingles X 2     Allergies: seasonal;     Anxiety         GYNECOLOGICAL HISTORY:             PREVENTIVE HEALTH MAINTENANCE             COLORECTAL CANCER SCREENING: declines colorectal cancer screening, understands reason for testing     EYE EXAM: was last done 2018     Hepatitis C Medicare Screening: was last done 1/8/2019; Normal- old provider     MAMMOGRAM: was last done 3/14/16 with normal results  declines, understands reason for testing     PAP SMEAR: No longer indicated due to age and history Hx hysterectomy         Surgical History:         Hysterectomy: ; total; Other Surgeries    vein ablation ;    wisdom teeth;         Family History:     Father:  at age 59; Cause of death was after CABG     Mother: Cause of death was PNA, Alzheimers         Social History:     Occupation: [employer]     Marital Status: Single         Tobacco/Alcohol/Supplements:     Last Reviewed on 2020 02:46 PM by Rosa Lucia    Tobacco: Current Smoker: She currently smokes every day, 1 pack per day.          Current Problems:     Type 2 diabetes mellitus without complications    Hyperlipidemia, unspecified    Anxiety disorder, unspecified    Other specified disorders of Eustachian tube, bilateral    Essential (primary) hypertension    Rash and other nonspecific skin eruption    Encounter for screening for depression    Encounter for general adult medical examination without abnormal findings    Acute sinusitis, unspecified        Immunizations:     None        Allergies:     Last Reviewed on 2020 02:46 PM by Rosa Lucia    Sulfonamides:          Current Medications:     Last Reviewed on 2020 02:46 PM by Rosa Lucia    Clotrimazole 1 % Topical Cream [APPLY  TO AREA BID]    Simvastatin 20 mg oral tablet [Take one and a half tablet(s) by mouth daily]    citalopram 20 mg oral tablet [take 1 tablet (20 mg) by oral route once daily]    metFORMIN 750 mg oral Tablet, Extended Release 24 hr [1 tab daily]    fluticasone propionate 50 mcg/actuation Intranasal Spray, Suspension [inhale 1 spray (50 mcg) in each nostril by intranasal route once daily]    loratadine 10 mg oral tablet [take 1 tablet (10 mg) by oral route once daily]    lisinopriL 20 mg oral tablet [take 1 tablet (20 mg) by oral route once daily]    amoxicillin 875 mg oral tablet [take 1 tablet (875 mg) by oral route every 12  hours X 10 days]        Objective:        Vitals:         Historical:     2/28/2020  BP:   174/77 mm Hg ( (right arm, , sitting, );) 2/28/2020  P:   63bpm ( (left arm (BP Cuff), , sitting, );) 2/28/2020  Wt:   227lbs    Current: 8/28/2020 1:03:32 PM    Ht:  5 ft, 8 in;  Wt: 221 lbs;  BMI: 33.6T: 97.9 F (temporal);  BP: 126/63 mm Hg (left arm, sitting);  P: 83 bpm (left arm (BP Cuff), sitting);  sCr: 0.77 mg/dL;  GFR: 92.79        Exams:     PHYSICAL EXAM:     GENERAL: well developed, well nourished;  no apparent distress;     RESPIRATORY: normal respiratory rate and pattern with no distress; normal breath sounds with no rales, rhonchi, wheezes or rubs;     CARDIOVASCULAR: normal rate; rhythm is regular;     NEUROLOGIC: mental status: alert and oriented x 3; GROSSLY INTACT     PSYCHIATRIC: appropriate affect and demeanor;     Left foot exam    Protective sensation using Monofilament test: NORMAL sensation. Patient detects .07 grams of force which is considered normal.    Vascular status: normal peripheral vascular exam with palpable dorsal pedal and posterior tibal pulses and brisk digital capillary refill    Skin is intact without sores or ulcers    Right foot exam    Protective sensation using Monofilament test: NORMAL sensation. Patient detects .07 grams of force which is considered normal.    Vascular status: normal peripheral vascular exam with palpable dorsal pedal and posterior tibal pulses and brisk digital capillary refill    Skin is intact without sores or ulcers         Assessment:         E11.9   Type 2 diabetes mellitus without complications       I10   Essential (primary) hypertension       E78.5   Hyperlipidemia, unspecified       F41.9   Anxiety disorder, unspecified       Z12.11   Encounter for screening for malignant neoplasm of colon       Z12.39   Encounter for other screening for malignant neoplasm of breast           ORDERS:         Meds Prescribed:       [Refilled] metFORMIN 750 mg oral Tablet,  Extended Release 24 hr [1 tab daily], #90 (ninety) tablets, Refills: 1 (one)       [Refilled] lisinopriL 20 mg oral tablet [take 1 tablet (20 mg) by oral route once daily], #90 (ninety) tablets, Refills: 1 (one)       [Refilled] Simvastatin 20 mg oral tablet [Take one and a half tablet(s) by mouth daily], #135 (one hundred and thirty five) tablets, Refills: 1 (one)       [Refilled] citalopram 20 mg oral tablet [take 1 tablet (20 mg) by oral route once daily], #90 (ninety) tablets, Refills: 1 (one)         Radiology/Test Orders:       [id number]  Screening mammography bi 2-view inc CAD  (Send-Out)              Lab Orders:       [id number]  Oncology colorectal screening alejandro 10 DNA markers  (Send-Out)            APPTO  Appointment need  (In-House)              Other Orders:       2028F  Foot examination performed (includes examination through visual inspection, sensory exam with monofilament, and pulse exam - report when any of the three components are completed) (DM)4  (In-House)            DLEYE  Dilated Eye Exam  (Send-Out)                      Plan:         Type 2 diabetes mellitus without complications        FOLLOW-UP: Schedule a follow-up visit in 6 months.      RECOMMENDATIONS: instructed in use of glucometer ( check glucose daily at random intervals ), adherance to Low carb, NCS diet diet, annual monofilament test for evaluating sensation in feet, daily foot self-inspection, annual eye exams, and need for yearly flu shots.            Prescriptions:       [Refilled] metFORMIN 750 mg oral Tablet, Extended Release 24 hr [1 tab daily], #90 (ninety) tablets, Refills: 1 (one)           Orders:       APPTO  Appointment need  (In-House)            DLEYE  Dilated Eye Exam  (Send-Out)              Essential (primary) hypertensionStable          Prescriptions:       [Refilled] lisinopriL 20 mg oral tablet [take 1 tablet (20 mg) by oral route once daily], #90 (ninety) tablets, Refills: 1 (one)         Hyperlipidemia,  unspecifiedStable          Prescriptions:       [Refilled] Simvastatin 20 mg oral tablet [Take one and a half tablet(s) by mouth daily], #135 (one hundred and thirty five) tablets, Refills: 1 (one)         Anxiety disorder, unspecifiedStable          Prescriptions:       [Refilled] citalopram 20 mg oral tablet [take 1 tablet (20 mg) by oral route once daily], #90 (ninety) tablets, Refills: 1 (one)         Encounter for screening for malignant neoplasm of colon        TESTS/PROCEDURES:  Will proceed with Cologuard to be performed/scheduled now.            Orders:       [id number]  Oncology colorectal screening alejandro 10 DNA markers  (Send-Out)              Encounter for other screening for malignant neoplasm of breast        RADIOLOGY:  I have ordered Mammogram Screening to be done today.            Orders:       [id number]  Screening mammography bi 2-view inc CAD  (Send-Out)                  Other Orders      2028F  Foot examination performed (includes examination through visual inspection, sensory exam with monofilament, and pulse exam - report when any of the three components are completed) (DM)4  (In-House)              Patient Recommendations:        For  Type 2 diabetes mellitus without complications:    Schedule a follow-up visit in 6 months.  Obtain instruction in the proper use of a home blood glucose monitor. Follow a diabetic diet as directed. Have an annual monofilament test to check for diabetes-related sensory nerve disturbance in the feet. Examine your feet daily.  Small cuts and injuries often go unnoticed and can lead to serious infections. Have an annual eye exam. Obtain a flu shot each year.              Charge Capture:         Primary Diagnosis:     E11.9  Type 2 diabetes mellitus without complications           Orders:      [id number]  Office/outpatient visit; established patient, level 4  (In-House)            APPTO  Appointment need  (In-House)              I10  Essential (primary) hypertension     E78.5   Hyperlipidemia, unspecified     F41.9  Anxiety disorder, unspecified     Z12.11  Encounter for screening for malignant neoplasm of colon     Z12.39  Encounter for other screening for malignant neoplasm of breast         Other Orders:       2028F  Foot examination performed (includes examination through visual inspection, sensory exam with monofilament, and pulse exam - report when any of the three components are completed) (DM)4  (In-House)

## 2021-07-01 VITALS
SYSTOLIC BLOOD PRESSURE: 138 MMHG | HEART RATE: 72 BPM | DIASTOLIC BLOOD PRESSURE: 49 MMHG | TEMPERATURE: 98.8 F | BODY MASS INDEX: 33.77 KG/M2 | WEIGHT: 222.8 LBS | HEIGHT: 68 IN

## 2021-07-02 VITALS
WEIGHT: 229.8 LBS | BODY MASS INDEX: 34.83 KG/M2 | HEART RATE: 78 BPM | SYSTOLIC BLOOD PRESSURE: 153 MMHG | TEMPERATURE: 98.4 F | DIASTOLIC BLOOD PRESSURE: 69 MMHG | HEIGHT: 68 IN

## 2021-07-02 VITALS
WEIGHT: 227 LBS | SYSTOLIC BLOOD PRESSURE: 174 MMHG | HEART RATE: 63 BPM | HEIGHT: 68 IN | TEMPERATURE: 97.7 F | BODY MASS INDEX: 34.4 KG/M2 | DIASTOLIC BLOOD PRESSURE: 77 MMHG

## 2021-07-02 VITALS
HEIGHT: 68 IN | TEMPERATURE: 97.2 F | HEART RATE: 56 BPM | SYSTOLIC BLOOD PRESSURE: 142 MMHG | DIASTOLIC BLOOD PRESSURE: 48 MMHG | WEIGHT: 233.4 LBS | BODY MASS INDEX: 35.37 KG/M2

## 2021-07-02 VITALS
WEIGHT: 221 LBS | HEIGHT: 68 IN | HEART RATE: 83 BPM | SYSTOLIC BLOOD PRESSURE: 126 MMHG | TEMPERATURE: 97.9 F | BODY MASS INDEX: 33.49 KG/M2 | DIASTOLIC BLOOD PRESSURE: 63 MMHG

## 2021-09-08 ENCOUNTER — LAB (OUTPATIENT)
Dept: LAB | Facility: HOSPITAL | Age: 61
End: 2021-09-08

## 2021-09-08 ENCOUNTER — OFFICE VISIT (OUTPATIENT)
Dept: FAMILY MEDICINE CLINIC | Age: 61
End: 2021-09-08

## 2021-09-08 VITALS
WEIGHT: 217.4 LBS | SYSTOLIC BLOOD PRESSURE: 147 MMHG | HEIGHT: 68 IN | HEART RATE: 65 BPM | DIASTOLIC BLOOD PRESSURE: 78 MMHG | TEMPERATURE: 98.2 F | BODY MASS INDEX: 32.95 KG/M2 | OXYGEN SATURATION: 96 %

## 2021-09-08 DIAGNOSIS — Z11.59 SCREENING FOR VIRAL DISEASE: ICD-10-CM

## 2021-09-08 DIAGNOSIS — H69.80 DYSFUNCTION OF EUSTACHIAN TUBE, UNSPECIFIED LATERALITY: ICD-10-CM

## 2021-09-08 DIAGNOSIS — F17.210 CIGARETTE NICOTINE DEPENDENCE WITHOUT COMPLICATION: ICD-10-CM

## 2021-09-08 DIAGNOSIS — F41.1 GENERALIZED ANXIETY DISORDER: ICD-10-CM

## 2021-09-08 DIAGNOSIS — E78.2 MIXED HYPERLIPIDEMIA: ICD-10-CM

## 2021-09-08 DIAGNOSIS — E11.9 TYPE 2 DIABETES MELLITUS WITHOUT COMPLICATION, WITHOUT LONG-TERM CURRENT USE OF INSULIN (HCC): ICD-10-CM

## 2021-09-08 DIAGNOSIS — E11.9 TYPE 2 DIABETES MELLITUS WITHOUT COMPLICATION, WITHOUT LONG-TERM CURRENT USE OF INSULIN (HCC): Primary | ICD-10-CM

## 2021-09-08 LAB
ALBUMIN SERPL-MCNC: 4.1 G/DL (ref 3.5–5.2)
ALBUMIN UR-MCNC: 4 MG/DL
ALBUMIN/GLOB SERPL: 1.1 G/DL
ALP SERPL-CCNC: 140 U/L (ref 39–117)
ALT SERPL W P-5'-P-CCNC: 22 U/L (ref 1–33)
ANION GAP SERPL CALCULATED.3IONS-SCNC: 10.6 MMOL/L (ref 5–15)
AST SERPL-CCNC: 24 U/L (ref 1–32)
BASOPHILS # BLD AUTO: 0.06 10*3/MM3 (ref 0–0.2)
BASOPHILS NFR BLD AUTO: 0.7 % (ref 0–1.5)
BILIRUB SERPL-MCNC: 0.4 MG/DL (ref 0–1.2)
BUN SERPL-MCNC: 8 MG/DL (ref 8–23)
BUN/CREAT SERPL: 11.3 (ref 7–25)
CALCIUM SPEC-SCNC: 9.6 MG/DL (ref 8.6–10.5)
CHLORIDE SERPL-SCNC: 96 MMOL/L (ref 98–107)
CHOLEST SERPL-MCNC: 177 MG/DL (ref 0–200)
CO2 SERPL-SCNC: 28.4 MMOL/L (ref 22–29)
CREAT SERPL-MCNC: 0.71 MG/DL (ref 0.57–1)
CREAT UR-MCNC: 37.5 MG/DL
DEPRECATED RDW RBC AUTO: 41 FL (ref 37–54)
EOSINOPHIL # BLD AUTO: 0.27 10*3/MM3 (ref 0–0.4)
EOSINOPHIL NFR BLD AUTO: 3.3 % (ref 0.3–6.2)
ERYTHROCYTE [DISTWIDTH] IN BLOOD BY AUTOMATED COUNT: 12.5 % (ref 12.3–15.4)
GFR SERPL CREATININE-BSD FRML MDRD: 83 ML/MIN/1.73
GLOBULIN UR ELPH-MCNC: 3.6 GM/DL
GLUCOSE SERPL-MCNC: 122 MG/DL (ref 65–99)
HBA1C MFR BLD: 6.64 % (ref 4.8–5.6)
HCT VFR BLD AUTO: 45.2 % (ref 34–46.6)
HCV AB SER DONR QL: NORMAL
HDLC SERPL-MCNC: 51 MG/DL (ref 40–60)
HGB BLD-MCNC: 15.3 G/DL (ref 12–15.9)
IMM GRANULOCYTES # BLD AUTO: 0.01 10*3/MM3 (ref 0–0.05)
IMM GRANULOCYTES NFR BLD AUTO: 0.1 % (ref 0–0.5)
LDLC SERPL CALC-MCNC: 105 MG/DL (ref 0–100)
LDLC/HDLC SERPL: 2 {RATIO}
LYMPHOCYTES # BLD AUTO: 2.12 10*3/MM3 (ref 0.7–3.1)
LYMPHOCYTES NFR BLD AUTO: 26 % (ref 19.6–45.3)
MCH RBC QN AUTO: 30.2 PG (ref 26.6–33)
MCHC RBC AUTO-ENTMCNC: 33.8 G/DL (ref 31.5–35.7)
MCV RBC AUTO: 89.3 FL (ref 79–97)
MICROALBUMIN/CREAT UR: 106.7 MG/G
MONOCYTES # BLD AUTO: 0.87 10*3/MM3 (ref 0.1–0.9)
MONOCYTES NFR BLD AUTO: 10.7 % (ref 5–12)
NEUTROPHILS NFR BLD AUTO: 4.81 10*3/MM3 (ref 1.7–7)
NEUTROPHILS NFR BLD AUTO: 59.2 % (ref 42.7–76)
PLATELET # BLD AUTO: 270 10*3/MM3 (ref 140–450)
PMV BLD AUTO: 10 FL (ref 6–12)
POTASSIUM SERPL-SCNC: 5.7 MMOL/L (ref 3.5–5.2)
PROT SERPL-MCNC: 7.7 G/DL (ref 6–8.5)
RBC # BLD AUTO: 5.06 10*6/MM3 (ref 3.77–5.28)
SODIUM SERPL-SCNC: 135 MMOL/L (ref 136–145)
TRIGL SERPL-MCNC: 119 MG/DL (ref 0–150)
VLDLC SERPL-MCNC: 21 MG/DL (ref 5–40)
WBC # BLD AUTO: 8.14 10*3/MM3 (ref 3.4–10.8)

## 2021-09-08 PROCEDURE — 36415 COLL VENOUS BLD VENIPUNCTURE: CPT

## 2021-09-08 PROCEDURE — 80061 LIPID PANEL: CPT

## 2021-09-08 PROCEDURE — 83036 HEMOGLOBIN GLYCOSYLATED A1C: CPT

## 2021-09-08 PROCEDURE — 82570 ASSAY OF URINE CREATININE: CPT

## 2021-09-08 PROCEDURE — 85025 COMPLETE CBC W/AUTO DIFF WBC: CPT

## 2021-09-08 PROCEDURE — 82043 UR ALBUMIN QUANTITATIVE: CPT

## 2021-09-08 PROCEDURE — 86803 HEPATITIS C AB TEST: CPT

## 2021-09-08 PROCEDURE — 99214 OFFICE O/P EST MOD 30 MIN: CPT | Performed by: NURSE PRACTITIONER

## 2021-09-08 PROCEDURE — 80053 COMPREHEN METABOLIC PANEL: CPT

## 2021-09-08 RX ORDER — SIMVASTATIN 20 MG
20 TABLET ORAL DAILY
Qty: 90 TABLET | Refills: 1 | Status: SHIPPED | OUTPATIENT
Start: 2021-09-08 | End: 2022-03-07 | Stop reason: SDUPTHER

## 2021-09-08 RX ORDER — METFORMIN HYDROCHLORIDE 750 MG/1
1 TABLET, EXTENDED RELEASE ORAL DAILY
COMMUNITY
Start: 2021-07-21 | End: 2021-09-08 | Stop reason: SDUPTHER

## 2021-09-08 RX ORDER — LISINOPRIL 20 MG/1
1 TABLET ORAL DAILY
COMMUNITY
Start: 2021-07-21 | End: 2021-09-08 | Stop reason: SDUPTHER

## 2021-09-08 RX ORDER — CITALOPRAM 20 MG/1
20 TABLET ORAL DAILY
Qty: 90 TABLET | Refills: 1 | Status: SHIPPED | OUTPATIENT
Start: 2021-09-08 | End: 2022-03-07 | Stop reason: SDUPTHER

## 2021-09-08 RX ORDER — LISINOPRIL 20 MG/1
20 TABLET ORAL DAILY
Qty: 90 TABLET | Refills: 1 | Status: SHIPPED | OUTPATIENT
Start: 2021-09-08 | End: 2022-03-07 | Stop reason: SDUPTHER

## 2021-09-08 RX ORDER — CITALOPRAM 20 MG/1
1 TABLET ORAL DAILY
COMMUNITY
Start: 2021-07-21 | End: 2021-09-08 | Stop reason: SDUPTHER

## 2021-09-08 RX ORDER — SIMVASTATIN 20 MG
20 TABLET ORAL DAILY
COMMUNITY
End: 2021-09-08 | Stop reason: SDUPTHER

## 2021-09-08 RX ORDER — METFORMIN HYDROCHLORIDE 750 MG/1
750 TABLET, EXTENDED RELEASE ORAL DAILY
Qty: 90 TABLET | Refills: 1 | Status: SHIPPED | OUTPATIENT
Start: 2021-09-08 | End: 2022-03-07 | Stop reason: SDUPTHER

## 2021-09-08 NOTE — PROGRESS NOTES
"Chief Complaint  Diabetes (FU, refills), Hyperlipidemia, and Anxiety    Subjective          Ellie Dubois presents to [facility]    Ellie is following up on chronic issues today. With regard to the type 2 diabetes, current meds include an oral hypoglycemic ( Glucophage XR), statin, and Ace inhibitor.  Most recent lab results include last HgbA1C 6.8 8/26/2020. Reports UTD on eye exam but will be due in a couple of months and plans to schedule appointment.     In regard to the hyperlipidemia, current treatment includes Zocor. Tolerating well.     Additionally, she presents with history of anxiety.  current treatment includes Citalopram.  Doing well. Denies suicidal ideation.  History of eustachian tube dysfunction. She has had several falls over the past 6 months. Difficulty hearing. Falls have occurred after turning head suddenly. Notes taking claritin daily and flonase as needed. Has seen ENT in the past stemming back to childhood during which she had procedures performed. Would like to see ENT again.   Due for screening mammogram. Declines to schedule at this time. Would like to defer to next appointment.   UTD cologuard.  Pap smear no longer indicated due to history of hysterectomy.  Has not had pneumococcal or shingles vaccine. Declines.           Objective   Vital Signs:   /78   Pulse 65   Temp 98.2 °F (36.8 °C)   Ht 172.7 cm (68\")   Wt 98.6 kg (217 lb 6.4 oz)   SpO2 96%   BMI 33.06 kg/m²       Physical Exam  Vitals reviewed.   Constitutional:       General: She is not in acute distress.     Appearance: Normal appearance. She is well-developed.   HENT:      Head: Normocephalic and atraumatic.      Right Ear: Tympanic membrane and ear canal normal.      Left Ear: Tympanic membrane normal.      Mouth/Throat:      Mouth: Mucous membranes are moist.      Pharynx: Oropharynx is clear.   Eyes:      Extraocular Movements: Extraocular movements intact.      Pupils: Pupils are " equal, round, and reactive to light.   Cardiovascular:      Rate and Rhythm: Normal rate and regular rhythm.      Pulses:           Dorsalis pedis pulses are 2+ on the right side and 2+ on the left side.   Pulmonary:      Effort: Pulmonary effort is normal.      Breath sounds: Normal breath sounds.   Feet:      Right foot:      Protective Sensation: 3 sites tested. 3 sites sensed.      Skin integrity: Skin integrity normal. No ulcer or blister.      Toenail Condition: Right toenails are normal.      Left foot:      Protective Sensation: 3 sites tested. 3 sites sensed.      Skin integrity: Skin integrity normal. No ulcer or blister.      Toenail Condition: Left toenails are normal.      Comments:      Neurological:      Mental Status: She is alert and oriented to person, place, and time. Mental status is at baseline.   Psychiatric:         Mood and Affect: Mood and affect normal.        Diabetic Foot Exam Performed  Result Review :   The following data was reviewed by: CORTES Welsh on 09/08/2021:  Lipid Panel (08/26/2020 07:10)  Comprehensive Metabolic Panel (08/26/2020 07:10)  CBC & Differential (08/26/2020 07:10)  Hemoglobin A1C With EAG (08/26/2020 07:10)           Assessment and Plan    Diagnoses and all orders for this visit:    1. Type 2 diabetes mellitus without complication, without long-term current use of insulin (CMS/[facility]) (Primary)  Comments:  Instructed in use of glucometer, adherance to Low carb, NCS diet, daily foot self-inspection, annual eye exams, and need for yearly flu shots.      Orders:  -     metFORMIN ER (GLUCOPHAGE-XR) 750 MG 24 hr tablet; Take 1 tablet by mouth Daily.  Dispense: 90 tablet; Refill: 1  -     lisinopril (PRINIVIL,ZESTRIL) 20 MG tablet; Take 1 tablet by mouth Daily.  Dispense: 90 tablet; Refill: 1  -     CBC and Differential; Future  -     Comprehensive metabolic panel; Future  -     Hemoglobin A1c; Future  -     Lipid panel; Future  -     Microalbumin / Creatinine Urine  Ratio - Urine, Clean Catch; Future    2. Mixed hyperlipidemia  Comments:  Checking labs  Orders:  -     simvastatin (ZOCOR) 20 MG tablet; Take 1 tablet by mouth Daily.  Dispense: 90 tablet; Refill: 1    3. Generalized anxiety disorder  Comments:  Medical condition is stable.  Continue same therapy.  Will recheck at next regular appointment.  Orders:  -     citalopram (CeleXA) 20 MG tablet; Take 1 tablet by mouth Daily.  Dispense: 90 tablet; Refill: 1    4. Dysfunction of Eustachian tube, unspecified laterality  Comments:  Referral to ENT as requested  Orders:  -     Ambulatory Referral to ENT (Otolaryngology)    5. Screening for viral disease  -     Hepatitis C Antibody; Future    6. Cigarette nicotine dependence without complication    Patient has been educated on the risk of diseases from using tobacco products such as cancer, COPD, and heart disease and has been advised to quit. I spent less than 3 minutes counseling the patient.        Follow Up    Return in about 6 months (around 3/8/2022) for Annual physical, Recheck scheduled 3/7/22 - sq.  Patient was given instructions and counseling regarding her condition or for health maintenance advice. Please see specific information pulled into the AVS if appropriate.

## 2021-10-01 ENCOUNTER — OFFICE VISIT (OUTPATIENT)
Dept: OTOLARYNGOLOGY | Facility: CLINIC | Age: 61
End: 2021-10-01

## 2021-10-01 ENCOUNTER — PROCEDURE VISIT (OUTPATIENT)
Dept: OTOLARYNGOLOGY | Facility: CLINIC | Age: 61
End: 2021-10-01

## 2021-10-01 VITALS — HEIGHT: 68 IN | WEIGHT: 220.2 LBS | BODY MASS INDEX: 33.37 KG/M2 | TEMPERATURE: 97.4 F

## 2021-10-01 DIAGNOSIS — I95.1 ORTHOSTATIC HYPOTENSION: Primary | ICD-10-CM

## 2021-10-01 DIAGNOSIS — H90.3 SENSORINEURAL HEARING LOSS (SNHL) OF BOTH EARS: ICD-10-CM

## 2021-10-01 DIAGNOSIS — R42 DIZZINESS AND GIDDINESS: ICD-10-CM

## 2021-10-01 DIAGNOSIS — H90.3 SENSORY HEARING LOSS, BILATERAL: ICD-10-CM

## 2021-10-01 PROCEDURE — 92557 COMPREHENSIVE HEARING TEST: CPT | Performed by: AUDIOLOGIST

## 2021-10-01 PROCEDURE — 92567 TYMPANOMETRY: CPT | Performed by: AUDIOLOGIST

## 2021-10-01 PROCEDURE — 99203 OFFICE O/P NEW LOW 30 MIN: CPT | Performed by: OTOLARYNGOLOGY

## 2021-10-12 NOTE — PROGRESS NOTES
Patient Name: Ellie Dubois   Visit Date: 10/01/2021   Patient ID: [id number]  Provider: Bhavesh Nicholas MD    Sex: female  Location: [facility]   YOB: 1960  Location Address: [address] [place] [address], [address],?[place]?[id number]    Primary Care Provider Nae Mcghee APRN  Location Phone: [phone]    Referring Provider: CORTES Leone        Chief Complaint  Other (ETD, hearing concerns,sinus, balance issuse)    Subjective    History of Present Illness  Ellie Dubois is a 61 y.o. female who presents to [facility] today as a consult from CORTES Leone.    She presents the clinic today for evaluation of lightheaded spells.  She describes no true vertigo with this, and mostly notes lightheadedness caused by positional changes such as when she arises from a laying down or sitting position.  She notes that this last for several seconds.  She does have some issues with hypertension and is on lisinopril for this.  She denies any balance issues.  She denies any headaches with this.    She also has some issues with eustachian tube dysfunction and notes that her hearing has declined.  She has not had a hearing test in quite some time.  She denies any history of significant recurrent ear disease.  Denies any recent infections or drainage.  She does have some pressure symptoms from allergic rhinitis and eustachian tube dysfunction.    Past Medical History:   Diagnosis Date   • Allergic rhinitis, seasonal    • Anxiety disorder, unspecified    • Essential (primary) hypertension    • ETD (eustachian tube dysfunction)    • HL (hearing loss)    • Hyperlipidemia, unspecified    • Nicotine dependence, unspecified, uncomplicated    • Shingles     X2   • Type 2 diabetes mellitus without complication (HCC)        Past Surgical History:   Procedure Laterality Date   • ENDOVENOUS ABLATION SAPHENOUS VEIN W/ LASER  2010   • TOTAL ABDOMINAL HYSTERECTOMY   "1973   • WISDOM TOOTH EXTRACTION           Current Outpatient Medications:   •  citalopram (CeleXA) 20 MG tablet, Take 1 tablet by mouth Daily., Disp: 90 tablet, Rfl: 1  •  lisinopril (PRINIVIL,ZESTRIL) 20 MG tablet, Take 1 tablet by mouth Daily., Disp: 90 tablet, Rfl: 1  •  metFORMIN ER (GLUCOPHAGE-XR) 750 MG 24 hr tablet, Take 1 tablet by mouth Daily., Disp: 90 tablet, Rfl: 1  •  simvastatin (ZOCOR) 20 MG tablet, Take 1 tablet by mouth Daily., Disp: 90 tablet, Rfl: 1     Allergies   Allergen Reactions   • Sulfa Antibiotics Hives and Swelling       Family History   Problem Relation Age of Onset   • Other Mother         PULMONARY NODULAR AMYLOIDOSIS   • Alzheimer's disease Mother    • Other Father         CORONARY ARTERY BYPASS GRAFT   • Hypertension Father    • Diabetes Maternal Grandfather    • Cancer Paternal Grandmother         Social History     Social History Narrative   • Not on file       Objective     Vital Signs:   Temp 97.4 °F (36.3 °C) (Temporal)   Ht 172.7 cm (68\")   Wt 99.9 kg (220 lb 3.2 oz)   BMI 33.48 kg/m²       Physical Exam         Constitutional   Appearance  · : well developed, well-nourished, alert and in no acute distress, voice clear and strong    Head  Inspection  · : no deformities or lesions  Face  Inspection  · : No facial lesions; House-Brackmann I/VI bilaterally  Palpation  · : No TMJ crepitus nor  muscle tenderness bilaterally    Eyes  Vision  Visual Fields  · : Extraocular movements are intact. No spontaneous or gaze-induced nystagmus.  Conjunctivae  · : clear  Sclerae  · : clear  Pupils and Irises  · : pupils equal, round, and reactive to light.     Ears, Nose, Mouth and Throat    Ears    External Ears  · : appearance within normal limits, no lesions present  Otoscopic Examination  · : Tympanic membrane appearance within normal limits bilaterally without perforations, well-aerated middle ears  Hearing  · : intact to conversational voice both ears  Tunning fork " testing:     :    Nose    External Nose  · : appearance normal  Intranasal Exam  · : mucosa within normal limits, vestibules normal, no intranasal lesions present, septum midline, sinuses non tender to percussion  Oral Cavity    Oral Mucosa  · : oral mucosa normal without pallor or cyanosis  Lips  · : lip appearance normal  Teeth  · : normal dentition for age  Gums  · : gums pink, non-swollen, no bleeding present  Tongue  · : tongue appearance normal; normal mobility  Palate  · : hard palate normal, soft palate appearance normal with symmetric mobility    Throat    Oropharynx  · : no inflammation or lesions present, tonsils within normal limits  Hypopharynx  · : appearance within normal limits, superior epiglottis within normal limits  Larynx  · : appearance within normal limits, vocal cords within normal limits, no lesions present    Neck  Inspection/Palpation  · : normal appearance, no masses or tenderness, trachea midline; thyroid size normal, nontender, no nodules or masses present on palpation    Respiratory  Respiratory Effort  · : breathing unlabored  Inspection of Chest  · : normal appearance, no retractions    Cardiovascular  Heart  · : regular rate and rhythm    Lymphatic  Neck  · : no lymphadenopathy present  Supraclavicular Nodes  · : no lymphadenopathy present  Preauricular Nodes  · : no lymphadenopathy present    Skin and Subcutaneous Tissue  General Inspection  · : Regarding face and neck - there are no rashes present, no lesions present, and no areas of discoloration    Neurologic  Cranial Nerves  · : cranial nerves II-XII are grossly intact bilaterally  Gait and Station  · : normal gait, normal tandem gait, negative Romberg, negative Fukuda marching test, negative [place]-Hallpike maneuver, some lightheadedness able to stand without diffculty    Psychiatric  Judgement and Insight  · : judgment and insight intact  Mood and Affect  · : mood normal, affect appropriate          Assessment and Plan {CC  Problem List  Visit Diagnosis  ROS  Review (Popup)  [facility] Maintenance  Quality  BestPractice  Medications  SmartSets  SnapShot Encounters  Media :23}   Diagnoses and all orders for this visit:    1. Orthostatic hypotension (Primary)    2. Sensorineural hearing loss (SNHL) of both ears  -     Comprehensive Hearing Test; Future  -     Tympanometry; Future    I spoke to her about orthostatic hypotension, and recommend that she keep track of her blood pressures.  I did not see any evidence of vestibular dysfunction, and her clinical history does not support this diagnosis.  I discussed adequate hydration and taking her time to avoid syncope and possible falls associated with this.  She may benefit from tilt table testing.    I did obtain an audiogram today which shows normal hearing sloping down to moderate sensorineural hearing loss in both ears.  Tympanogram showed negative pressure.  Were described scores were good.  I think she is a very good candidate for hearing aids, and I put her in touch with my audiologist for further work-up for this.  I will see her back on an as-needed basis should there be any further issues.    Follow Up   No follow-ups on file.  Patient was given instructions and counseling regarding her condition or for health maintenance advice. Please see specific information pulled into the AVS if appropriate.

## 2022-03-07 ENCOUNTER — LAB (OUTPATIENT)
Dept: LAB | Facility: HOSPITAL | Age: 62
End: 2022-03-07

## 2022-03-07 ENCOUNTER — TELEPHONE (OUTPATIENT)
Dept: FAMILY MEDICINE CLINIC | Age: 62
End: 2022-03-07

## 2022-03-07 ENCOUNTER — HOSPITAL ENCOUNTER (OUTPATIENT)
Dept: GENERAL RADIOLOGY | Facility: HOSPITAL | Age: 62
Discharge: HOME | End: 2022-03-07

## 2022-03-07 ENCOUNTER — OFFICE VISIT (OUTPATIENT)
Dept: FAMILY MEDICINE CLINIC | Age: 62
End: 2022-03-07

## 2022-03-07 VITALS
BODY MASS INDEX: 33.04 KG/M2 | SYSTOLIC BLOOD PRESSURE: 127 MMHG | WEIGHT: 218 LBS | TEMPERATURE: 98.6 F | HEART RATE: 68 BPM | DIASTOLIC BLOOD PRESSURE: 66 MMHG | HEIGHT: 68 IN

## 2022-03-07 DIAGNOSIS — M25.562 LEFT KNEE PAIN, UNSPECIFIED CHRONICITY: ICD-10-CM

## 2022-03-07 DIAGNOSIS — E78.2 MIXED HYPERLIPIDEMIA: ICD-10-CM

## 2022-03-07 DIAGNOSIS — E11.9 TYPE 2 DIABETES MELLITUS WITHOUT COMPLICATION, WITHOUT LONG-TERM CURRENT USE OF INSULIN: ICD-10-CM

## 2022-03-07 DIAGNOSIS — Z12.31 ENCOUNTER FOR SCREENING MAMMOGRAM FOR MALIGNANT NEOPLASM OF BREAST: ICD-10-CM

## 2022-03-07 DIAGNOSIS — Z00.00 ANNUAL PHYSICAL EXAM: Primary | ICD-10-CM

## 2022-03-07 DIAGNOSIS — Z00.00 ANNUAL PHYSICAL EXAM: ICD-10-CM

## 2022-03-07 DIAGNOSIS — M25.562 LEFT KNEE PAIN, UNSPECIFIED CHRONICITY: Primary | ICD-10-CM

## 2022-03-07 DIAGNOSIS — F41.1 GENERALIZED ANXIETY DISORDER: ICD-10-CM

## 2022-03-07 LAB
ALBUMIN SERPL-MCNC: 4.4 G/DL (ref 3.5–5.2)
ALBUMIN/GLOB SERPL: 1.3 G/DL
ALP SERPL-CCNC: 99 U/L (ref 39–117)
ALT SERPL W P-5'-P-CCNC: 21 U/L (ref 1–33)
ANION GAP SERPL CALCULATED.3IONS-SCNC: 12.3 MMOL/L (ref 5–15)
AST SERPL-CCNC: 23 U/L (ref 1–32)
BASOPHILS # BLD AUTO: 0.09 10*3/MM3 (ref 0–0.2)
BASOPHILS NFR BLD AUTO: 1.1 % (ref 0–1.5)
BILIRUB SERPL-MCNC: 0.6 MG/DL (ref 0–1.2)
BUN SERPL-MCNC: 7 MG/DL (ref 8–23)
BUN/CREAT SERPL: 11.1 (ref 7–25)
CALCIUM SPEC-SCNC: 9.8 MG/DL (ref 8.6–10.5)
CHLORIDE SERPL-SCNC: 97 MMOL/L (ref 98–107)
CHOLEST SERPL-MCNC: 140 MG/DL (ref 0–200)
CO2 SERPL-SCNC: 25.7 MMOL/L (ref 22–29)
CREAT SERPL-MCNC: 0.63 MG/DL (ref 0.57–1)
DEPRECATED RDW RBC AUTO: 43.9 FL (ref 37–54)
EGFRCR SERPLBLD CKD-EPI 2021: 99.8 ML/MIN/1.73
EOSINOPHIL # BLD AUTO: 0.16 10*3/MM3 (ref 0–0.4)
EOSINOPHIL NFR BLD AUTO: 2 % (ref 0.3–6.2)
ERYTHROCYTE [DISTWIDTH] IN BLOOD BY AUTOMATED COUNT: 12.6 % (ref 12.3–15.4)
GLOBULIN UR ELPH-MCNC: 3.4 GM/DL
GLUCOSE SERPL-MCNC: 112 MG/DL (ref 65–99)
HBA1C MFR BLD: 5.8 % (ref 4.8–5.6)
HCT VFR BLD AUTO: 44 % (ref 34–46.6)
HDLC SERPL-MCNC: 54 MG/DL (ref 40–60)
HGB BLD-MCNC: 14.3 G/DL (ref 12–15.9)
IMM GRANULOCYTES # BLD AUTO: 0.01 10*3/MM3 (ref 0–0.05)
IMM GRANULOCYTES NFR BLD AUTO: 0.1 % (ref 0–0.5)
LDLC SERPL CALC-MCNC: 69 MG/DL (ref 0–100)
LDLC/HDLC SERPL: 1.26 {RATIO}
LYMPHOCYTES # BLD AUTO: 2.01 10*3/MM3 (ref 0.7–3.1)
LYMPHOCYTES NFR BLD AUTO: 25.6 % (ref 19.6–45.3)
MCH RBC QN AUTO: 30.5 PG (ref 26.6–33)
MCHC RBC AUTO-ENTMCNC: 32.5 G/DL (ref 31.5–35.7)
MCV RBC AUTO: 93.8 FL (ref 79–97)
MONOCYTES # BLD AUTO: 0.91 10*3/MM3 (ref 0.1–0.9)
MONOCYTES NFR BLD AUTO: 11.6 % (ref 5–12)
NEUTROPHILS NFR BLD AUTO: 4.67 10*3/MM3 (ref 1.7–7)
NEUTROPHILS NFR BLD AUTO: 59.6 % (ref 42.7–76)
PLATELET # BLD AUTO: 279 10*3/MM3 (ref 140–450)
PMV BLD AUTO: 9.9 FL (ref 6–12)
POTASSIUM SERPL-SCNC: 4.8 MMOL/L (ref 3.5–5.2)
PROT SERPL-MCNC: 7.8 G/DL (ref 6–8.5)
RBC # BLD AUTO: 4.69 10*6/MM3 (ref 3.77–5.28)
SODIUM SERPL-SCNC: 135 MMOL/L (ref 136–145)
TRIGL SERPL-MCNC: 89 MG/DL (ref 0–150)
VLDLC SERPL-MCNC: 17 MG/DL (ref 5–40)
WBC NRBC COR # BLD: 7.85 10*3/MM3 (ref 3.4–10.8)

## 2022-03-07 PROCEDURE — 36415 COLL VENOUS BLD VENIPUNCTURE: CPT

## 2022-03-07 PROCEDURE — 80053 COMPREHEN METABOLIC PANEL: CPT

## 2022-03-07 PROCEDURE — 80061 LIPID PANEL: CPT

## 2022-03-07 PROCEDURE — 73562 X-RAY EXAM OF KNEE 3: CPT

## 2022-03-07 PROCEDURE — 83036 HEMOGLOBIN GLYCOSYLATED A1C: CPT

## 2022-03-07 PROCEDURE — 85025 COMPLETE CBC W/AUTO DIFF WBC: CPT

## 2022-03-07 PROCEDURE — 99396 PREV VISIT EST AGE 40-64: CPT | Performed by: NURSE PRACTITIONER

## 2022-03-07 RX ORDER — LISINOPRIL 20 MG/1
20 TABLET ORAL DAILY
Qty: 90 TABLET | Refills: 1 | Status: SHIPPED | OUTPATIENT
Start: 2022-03-07 | End: 2022-09-26 | Stop reason: SDUPTHER

## 2022-03-07 RX ORDER — SIMVASTATIN 20 MG
20 TABLET ORAL DAILY
Qty: 90 TABLET | Refills: 1 | Status: SHIPPED | OUTPATIENT
Start: 2022-03-07 | End: 2022-09-23

## 2022-03-07 RX ORDER — CITALOPRAM 20 MG/1
20 TABLET ORAL DAILY
Qty: 90 TABLET | Refills: 1 | Status: SHIPPED | OUTPATIENT
Start: 2022-03-07 | End: 2022-09-26 | Stop reason: SDUPTHER

## 2022-03-07 RX ORDER — METFORMIN HYDROCHLORIDE 750 MG/1
750 TABLET, EXTENDED RELEASE ORAL DAILY
Qty: 90 TABLET | Refills: 1 | Status: SHIPPED | OUTPATIENT
Start: 2022-03-07 | End: 2022-09-26 | Stop reason: SDUPTHER

## 2022-03-07 NOTE — PROGRESS NOTES
Chief Complaint  Ellie Dubois presents to [facility] for Annual Exam (Pt says she has right knee swelling off and on)    Subjective          History of Present Illness    Ellie is here today for annual preventive exam.   UTD Tdap and covid vaccine.  Declines PNA, zoster, influenza vaccine.  Normal cologuard 10/12/2020.   Due for mammogram.  Pap smear no longer indicated due to history of hysterectomy.    Ellie has type 2 diabetes, Current meds include an oral hypoglycemic ( Glucophage XR), statin, and Ace inhibitor.  Most recent lab results include last HgbA1C 6.64. Reports UTD on eye exam with [facility] in [place].   In regard to the hyperlipidemia, current treatment includes Zocor. Tolerating well.   Additionally, she presents with history of anxiety.  current treatment includes Citalopram.  Doing well. Denies suicidal ideation.  She is going to be applying for job in [place] with [id number] over the summer.   C/o left knee pain. Worsening over the past few weeks. She is now working at [employer] standing on concrete floors. Has been taking Advil and wearing brace. Also applying aspercreme.     Review of Systems   Constitutional: Negative for chills and fever.   HENT: Negative for ear pain and sore throat.    Eyes: Negative for blurred vision and redness.   Respiratory: Negative for cough, shortness of breath and wheezing.    Cardiovascular: Negative for chest pain and palpitations.   Gastrointestinal: Negative for abdominal pain, constipation, diarrhea, nausea and vomiting.   Genitourinary: Negative for dysuria, frequency and urgency.   Musculoskeletal: Negative for back pain and joint swelling.   Skin: Negative for rash.   Neurological: Negative for dizziness and headache.   Psychiatric/Behavioral: Negative for suicidal ideas and depressed mood.          Allergies   Allergen Reactions   • Sulfa Antibiotics Hives and Swelling      Past Medical History:   Diagnosis Date   • Allergic  rhinitis, seasonal    • Anxiety disorder, unspecified    • Essential (primary) hypertension    • HL (hearing loss)    • Hyperlipidemia, unspecified    • Nicotine dependence, unspecified, uncomplicated    • Shingles     X2   • Type 2 diabetes mellitus without complication (HCC)      Current Outpatient Medications   Medication Sig Dispense Refill   • citalopram (CeleXA) 20 MG tablet Take 1 tablet by mouth Daily. 90 tablet 1   • lisinopril (PRINIVIL,ZESTRIL) 20 MG tablet Take 1 tablet by mouth Daily. 90 tablet 1   • metFORMIN ER (GLUCOPHAGE-XR) 750 MG 24 hr tablet Take 1 tablet by mouth Daily. 90 tablet 1   • simvastatin (ZOCOR) 20 MG tablet Take 1 tablet by mouth Daily. 90 tablet 1     No current facility-administered medications for this visit.     Past Surgical History:   Procedure Laterality Date   • ENDOVENOUS ABLATION SAPHENOUS VEIN W/ LASER  2010   • TOTAL ABDOMINAL HYSTERECTOMY  1973   • WISDOM TOOTH EXTRACTION        Social History     Tobacco Use   • Smoking status: Current Every Day Smoker     Packs/day: 1.00     Years: 7.00     Pack years: 7.00     Types: Cigarettes   • Smokeless tobacco: Never Used   Vaping Use   • Vaping Use: Former   Substance Use Topics   • Alcohol use: Not Currently   • Drug use: Never     Family History   Problem Relation Age of Onset   • Other Mother         PULMONARY NODULAR AMYLOIDOSIS   • Alzheimer's disease Mother    • Other Father         CORONARY ARTERY BYPASS GRAFT   • Hypertension Father    • Diabetes Maternal Grandfather    • Cancer Paternal Grandmother      Health Maintenance Due   Topic Date Due   • MAMMOGRAM  03/14/2018   • DIABETIC EYE EXAM  Never done   • ANNUAL PHYSICAL  03/05/2022      Immunization History   Administered Date(s) Administered   • COVID-19 (MODERNA) 1st, 2nd, 3rd Dose Only 03/09/2021, 04/06/2021   • COVID-19 (MODERNA) BOOSTER 12/13/2021   • Td 07/25/2015        Objective     Vitals:    03/07/22 1035   BP: 127/66   BP Location: Right arm   Patient  "Position: Sitting   Pulse: 68   Temp: 98.6 °F (37 °C)   TempSrc: Oral   Weight: 98.9 kg (218 lb)   Height: 172.7 cm (68\")     Body mass index is 33.15 kg/m².     Physical Exam  Vitals reviewed.   Constitutional:       General: She is not in acute distress.     Appearance: Normal appearance. She is well-developed.   HENT:      Head: Normocephalic and atraumatic.      Right Ear: Hearing, tympanic membrane and ear canal normal.      Left Ear: Hearing, tympanic membrane and ear canal normal.      Mouth/Throat:      Mouth: Mucous membranes are moist.   Eyes:      Extraocular Movements: Extraocular movements intact.      Pupils: Pupils are equal, round, and reactive to light.   Cardiovascular:      Rate and Rhythm: Normal rate and regular rhythm.      Pulses: Normal pulses.      Heart sounds: Normal heart sounds.   Pulmonary:      Effort: Pulmonary effort is normal.      Breath sounds: Normal breath sounds.   Abdominal:      General: Bowel sounds are normal.      Palpations: Abdomen is soft.      Tenderness: There is no abdominal tenderness.   Musculoskeletal:         General: Normal range of motion.      Cervical back: Normal range of motion and neck supple.   Skin:     General: Skin is warm and dry.   Neurological:      Mental Status: She is alert and oriented to person, place, and time.   Psychiatric:         Mood and Affect: Mood normal.           Result Review :     The following data was reviewed by: CORTES Welsh on 03/07/2022:          Microalbumin / Creatinine Urine Ratio - Urine, Clean Catch (09/08/2021 09:48)  Lipid panel (09/08/2021 09:48)  Hemoglobin A1c (09/08/2021 09:48)  Comprehensive metabolic panel (09/08/2021 09:48)  CBC and Differential (09/08/2021 09:48)  Hepatitis C Antibody (09/08/2021 09:48)                  Assessment and Plan      Diagnoses and all orders for this visit:    1. Annual physical exam (Primary)  Comments:  Counseled health maintenance recommendations.   Orders:  -     CBC & " Differential; Future  -     Comprehensive Metabolic Panel; Future  -     Lipid Panel; Future    2. Encounter for screening mammogram for malignant neoplasm of breast  -     Mammo Screening Digital Tomosynthesis Bilateral With CAD; Future    3. Type 2 diabetes mellitus without complication, without long-term current use of insulin (HCC)  Comments:  Instructed in use of glucometer, adherance to Low carb, NCS diet, daily foot self-inspection, annual eye exams, and need for yearly flu shots.      Orders:  -     metFORMIN ER (GLUCOPHAGE-XR) 750 MG 24 hr tablet; Take 1 tablet by mouth Daily.  Dispense: 90 tablet; Refill: 1  -     lisinopril (PRINIVIL,ZESTRIL) 20 MG tablet; Take 1 tablet by mouth Daily.  Dispense: 90 tablet; Refill: 1  -     Hemoglobin A1c; Future    4. Mixed hyperlipidemia  Comments:  Checking labs  Orders:  -     simvastatin (ZOCOR) 20 MG tablet; Take 1 tablet by mouth Daily.  Dispense: 90 tablet; Refill: 1    5. Generalized anxiety disorder  Comments:  Medical condition is stable.  Continue same therapy.  Will recheck at next regular appointment.  Orders:  -     citalopram (CeleXA) 20 MG tablet; Take 1 tablet by mouth Daily.  Dispense: 90 tablet; Refill: 1    6. Left knee pain, unspecified chronicity  Comments:  OTC Voltaren gel. Xray. Consider PT and/or ortho referral.   Orders:  -     XR Knee 3 View Left; Future              Follow Up     Return in about 6 months (around 9/7/2022) for Recheck.

## 2022-03-10 ENCOUNTER — TREATMENT (OUTPATIENT)
Dept: PHYSICAL THERAPY | Facility: CLINIC | Age: 62
End: 2022-03-10

## 2022-03-10 DIAGNOSIS — R26.89 BALANCE PROBLEM: ICD-10-CM

## 2022-03-10 DIAGNOSIS — R29.898 WEAKNESS OF LEFT LOWER EXTREMITY: ICD-10-CM

## 2022-03-10 DIAGNOSIS — M25.562 LEFT KNEE PAIN, UNSPECIFIED CHRONICITY: Primary | ICD-10-CM

## 2022-03-10 DIAGNOSIS — R29.898 WEAKNESS OF LEFT HIP: ICD-10-CM

## 2022-03-10 PROCEDURE — 97161 PT EVAL LOW COMPLEX 20 MIN: CPT | Performed by: PHYSICAL THERAPIST

## 2022-03-10 NOTE — PROGRESS NOTES
Physical Therapy Initial Evaluation and Plan of Care      Patient: Ellie Dubois   : 1960  Diagnosis/ICD-10 Code:  Left knee pain, unspecified chronicity [M25.562]  Referring practitioner: CORTES Leone  Date of Initial Visit: 3/10/2022  Today's Date: 3/10/2022  Patient seen for 1 sessions         Visit Diagnoses:    ICD-10-CM ICD-9-CM   1. Left knee pain, unspecified chronicity  M25.562 719.46   2. Weakness of left lower extremity  R29.898 729.89   3. Weakness of left hip  R29.898 729.89   4. Balance problem  R26.89 781.99       Subjective Questionnaire: LEFS: 37      Subjective Evaluation    History of Present Illness  Mechanism of injury: Pt presents to [facility] to be evaluated for L knee pain.    Pt relates has had L lateral knee pain , intermittent, over the last 2.5-3 years.    [facility] was working to fix up house to sell, and renovated new [place], noticed L lateral knee pain.  Another time, [facility] was carrying a heavy pot of soup and slipped.     Work(s/ed) at [employer], on feet 10 hr shifts on concrete, knee felt sore after shift. .     Currently working 25 +/- hours over 4-5 days at [employer].  Relates L lateral knee is sore when transferring to car.     States when knee is sore, is painful to WB, has antalgic gait pattern.     States last night had a sharp pain in lateral knee while sitting.     Pt describes pain in L lateral knee,, over LJL, LCL, lateral tibial plateau. Increased bony hypertrophy noted.     States balance is fair, consistently ( SLS, will trip on occasion).              Patient Occupation: retired   Quality of life: good    Pain  Current pain ratin  At best pain ratin  At worst pain rating: 10  Location: L lateral knee  Quality: dull ache, sharp, radiating and throbbing  Relieving factors: change in position (advil, compression sleeve, arthritis creme)  Aggravating factors: standing (prolonger positioning)  Progression: no  change    Social Support  Lives in: one-story house    Hand dominance: right    Patient Goals  Patient goals for therapy: decreased pain, increased strength and improved balance             Objective          Ambulation     Comments   Normalized transfers this morning. Trace decreased WB L LE, while walking this morning.      General Comments     Knee Comments  R knee ROM, strength WFL    L knee: ext aprox -8, flexion 100, mild c/o lateral knee    MMT: (mild guarding with L LE testing )   L hip ext 4, abd 4-, add 4, flexion 4, IR 4-, ER 4-  L knee ext 4-, fair recruitment, fair tone;  Flexion 4-    No c/o with valgus, varus, A/P joint mobility, no c/o with rotation to assess meniscus      SLS fair/fair- on ea leg           Assessment & Plan     Assessment  Impairments: abnormal gait, abnormal muscle firing, abnormal muscle tone, abnormal or restricted ROM, activity intolerance, impaired physical strength, lacks appropriate home exercise program and pain with function  Other impairment: fair balance  Functional Limitations: walking, pulling, pushing, uncomfortable because of pain, moving in bed, sitting and standing  Assessment details: Pt presents with decreased functional mobility due to c/o L knee pain.     Pt demonstrates/ reports:   -L knee pain with loaded activities when fatigued,   -ROM limitations,   -L LE weakness, poor hip stabilization  -altered gait pattern,   -difficulty performing ADLs, transfers,  -difficulty performing community/work/ home activities  due to  L knee pain,  -interference with job duties due to L knee pain,  -lack of appropriate progressive HEP.     Pt's strength deficit paired with guarding of the L LE may be causing joint irritability, fatiguing early, and also contributing to balance deficits.     Pt would benefit from skilled physical  therapy intervention to address the above mentioned deficits.     Pt was given WHEP of today's exercises.  Pt related understanding of precautions,  progression parameters. Questions were addressed as they arose.    Barriers to therapy: [place]  Prognosis: good    Goals  Plan Goals: Short Term Goals (6 weeks):    Patient is independent with HEP each visit.     Patient is able to sleep without being disrupted by pain.    Patient has full L knee extension, pain free.     Patient has minimal to no tenderness to palpation to L lateral  knee.     Pt will tolerate 30 min - 1 hour, or more, prolonged walking and standing.       Long Term Goals (12 weeks):     Patient has functional ( -3 to 120 +/- degrees, or better) AROM/PROM of L knee, pain free.    Patient is able to return to work/community activities with minimal to no discomfort.    Patient is able stand and walk for >1.5 hours,  for work/community related tasks.    Pt will demonstrate normalized gait pattern.     Pt will return to work without functional limitations.    Pt will be I in final HEP and progression parameters to perform after formal physical therapy has been discontinued.      Plan  Therapy options: will be seen for skilled therapy services  Planned modality interventions: dry needling, cryotherapy and thermotherapy (hydrocollator packs)  Other planned modality interventions: any other modalities as indicated to benefit the pt  Planned therapy interventions: flexibility, home exercise program, neuromuscular re-education, manual therapy, soft tissue mobilization, strengthening, stretching, balance/weight-bearing training, body mechanics training, functional ROM exercises, gait training, motor coordination training, therapeutic activities and transfer training  Other planned therapy interventions: any other intervention deemed necessary to benefit the pt  Frequency: 2x week  Duration in weeks: 12  Treatment plan discussed with: patient and PTA  Plan details: Continue therapy involving education, stretching, strengthening, stabilization training,balance progression when indicated,  modalities as indicated,  manual therapy techniques, progressive HEP instruction.    Next visit, reassess tolerance to current exercises and modify and/or progress as indicated.           Planned interventions:  Any other modality and/or intervention deemed necessary to benefit the patient.        History # of Personal Factors and/or Comorbidities: MODERATE (1-2)  Examination of Body System(s): # of elements: HIGH (4+)  Clinical Presentation: STABLE   Clinical Decision Making: LOW       Timed:  Manual Therapy:         mins  68407;  Therapeutic Exercise:    15     mins  65326;     Neuromuscular Timi:        mins  07499;    Therapeutic Activity:          mins  69127;     Gait Training:           mins  39095;     Ultrasound:          mins  89986;    Canalith Repos           ___  mins  01801      Untimed:  Electrical Stimulation:         mins  16760 ( );  Mechanical Traction:         mins  39706;     Low Complexity Evaluation:                      30     mins  44321;  Moderate Complexity Evaluation:                  mins  35952;   High Complexity Evaluation:                          mins  20904       Timed Treatment:   15   mins   Total Treatment:     45   mins      DATE TREATMENT INITIATED: 3/10/2022              PT SIGNATURE: Lizz Mejia PT   [place] License: [id number]  This document has been electronically signed by Lizz Mejia PT on March 10, 2022 11:52 EST        Initial Certification    Certification Period: 3/10/2022 thru 6/7/2022  I certify that the therapy services are furnished while this patient is under my care.  The services outlined above are required by this patient, and will be reviewed every 90 days.     PHYSICIAN: Nae Mcghee APRN   NPI: [id number]      PHYSICIAN PRINT NAME: ______________________________________________      PHYSICIAN SIGNATURE: ______________________________________________         DATE:________________________________

## 2022-03-17 ENCOUNTER — TREATMENT (OUTPATIENT)
Dept: PHYSICAL THERAPY | Facility: CLINIC | Age: 62
End: 2022-03-17

## 2022-03-17 DIAGNOSIS — R26.89 BALANCE PROBLEM: ICD-10-CM

## 2022-03-17 DIAGNOSIS — R29.898 WEAKNESS OF LEFT LOWER EXTREMITY: ICD-10-CM

## 2022-03-17 DIAGNOSIS — M25.562 LEFT KNEE PAIN, UNSPECIFIED CHRONICITY: Primary | ICD-10-CM

## 2022-03-17 DIAGNOSIS — R29.898 WEAKNESS OF LEFT HIP: ICD-10-CM

## 2022-03-17 PROCEDURE — 97110 THERAPEUTIC EXERCISES: CPT | Performed by: PHYSICAL THERAPIST

## 2022-03-17 PROCEDURE — 97112 NEUROMUSCULAR REEDUCATION: CPT | Performed by: PHYSICAL THERAPIST

## 2022-03-17 NOTE — PROGRESS NOTES
Physical Therapy Daily Treatment Note      Patient: Ellie Dubois   : 1960  Referring practitioner: CORTES Leone  Date of Initial Visit: No linked episodes  Today's Date: 3/17/2022  Patient seen for Visit count could not be calculated. Make sure you are using a visit which is associated with an episode. sessions           Subjective Evaluation    History of Present Illness    Subjective comment: 0/10 at tx time but had a pain of 10/10 that woke her from sleep last night.       Objective   See Exercise, Manual, and Modality Logs for complete treatment.       Assessment & Plan     Assessment  Impairments: abnormal gait, abnormal muscle firing, abnormal muscle tone, abnormal or restricted ROM, activity intolerance, impaired physical strength, lacks appropriate home exercise program and pain with function  Other impairment: fair balance  Functional Limitations: walking, pulling, pushing, uncomfortable because of pain, moving in bed, sitting and standing  Assessment details: Pt required vc and demonstration of all exercises due to this being her first tx visit. Pt was given a variety of activities to address strength, balance and advancing to work related activities.  Pt became very fearful at the start of the foam balance beam, she froze got off and started to break down. Pt shared she has had more then one bad fall breaking several bones with her falls and is fearful of the activity. The balance activity was moved to the floor on flat surface where pt was able to complete with less fear.    Barriers to therapy: [place]  Prognosis: good    Goals  Plan Goals: Short Term Goals (3 weeks):    Patient is independent with HEP each visit.     Patient is able to sleep without being disrupted by pain.    Patient has full L knee extension, pain free.     Patient has minimal to no tenderness to palpation to L lateral  knee.     Pt will tolerate 30 min - 1 hour, or more, prolonged walking and standing.       Long Term  Goals 12 weeks):     Patient has functional ( -3 to 120 +/- degrees, or better) AROM/PROM of L knee, pain free.    Patient is able to return to work/community activities with minimal to no discomfort.    Patient is able stand and walk for >1.5 hours,  for work/community related tasks.    Pt will demonstrate normalized gait pattern.     Pt will return to work without functional limitations.    Pt will be I in final HEP and progression parameters to perform after formal physical therapy has been discontinued.      Plan  Therapy options: will be seen for skilled therapy services  Planned modality interventions: dry needling, cryotherapy and thermotherapy (hydrocollator packs)  Other planned modality interventions: any other modalities as indicated to benefit the pt  Planned therapy interventions: flexibility, home exercise program, neuromuscular re-education, manual therapy, soft tissue mobilization, strengthening, stretching, balance/weight-bearing training, body mechanics training, functional ROM exercises, gait training, motor coordination training, therapeutic activities and transfer training  Other planned therapy interventions: any other intervention deemed necessary to benefit the pt  Frequency: 2x week  Duration in weeks: 12  Treatment plan discussed with: patient and PTA  Plan details: Be mindful of pt fear of falling and ease pt back into balance activities. Cont to work toward strength and activities to help with work related duties.            Visit Diagnoses:    ICD-10-CM ICD-9-CM   1. Left knee pain, unspecified chronicity  M25.562 719.46   2. Weakness of left lower extremity  R29.898 729.89   3. Weakness of left hip  R29.898 729.89   4. Balance problem  R26.89 781.99       Progress per Plan of Care    Timed:    Therapeutic Exercise:    28     mins  49359;  Manual Therapy:         mins  61324;     Neuromuscular Timi:   14    mins  52929;    Therapeutic Activity:          mins  40604;     Gait Training:            mins  96650;     Ultrasound:          mins  29040;      Untimed:  Electrical Stimulation:         mins  10061 ( );  Mechanical Traction:         mins  22546;     Timed Treatment:   42   mins   Total Treatment:     45   mins      Nanyc Villareal PTA  Physical Therapist Assistant

## 2022-03-21 ENCOUNTER — TELEPHONE (OUTPATIENT)
Dept: PHYSICAL THERAPY | Facility: CLINIC | Age: 62
End: 2022-03-21

## 2022-03-25 ENCOUNTER — TREATMENT (OUTPATIENT)
Dept: PHYSICAL THERAPY | Facility: CLINIC | Age: 62
End: 2022-03-25

## 2022-03-25 DIAGNOSIS — R29.898 WEAKNESS OF LEFT HIP: ICD-10-CM

## 2022-03-25 DIAGNOSIS — R29.898 WEAKNESS OF LEFT LOWER EXTREMITY: ICD-10-CM

## 2022-03-25 DIAGNOSIS — R26.89 BALANCE PROBLEM: ICD-10-CM

## 2022-03-25 DIAGNOSIS — M25.562 LEFT KNEE PAIN, UNSPECIFIED CHRONICITY: Primary | ICD-10-CM

## 2022-03-25 PROCEDURE — 97112 NEUROMUSCULAR REEDUCATION: CPT | Performed by: PHYSICAL THERAPIST

## 2022-03-25 PROCEDURE — 97530 THERAPEUTIC ACTIVITIES: CPT | Performed by: PHYSICAL THERAPIST

## 2022-03-25 PROCEDURE — 97116 GAIT TRAINING THERAPY: CPT | Performed by: PHYSICAL THERAPIST

## 2022-03-25 NOTE — PROGRESS NOTES
"   Physical Therapy Treatment Note      Patient: Ellie Dubois   : 1960  Referring practitioner: CORTES Leone  Date of Initial Visit: Type: THERAPY  Noted: 3/10/2022  Today's Date: 3/25/2022  Patient seen for 2 sessions           Visit Diagnoses:    ICD-10-CM ICD-9-CM   1. Left knee pain, unspecified chronicity  M25.562 719.46   2. Weakness of left lower extremity  R29.898 729.89   3. Weakness of left hip  R29.898 729.89   4. Balance problem  R26.89 781.99       Subjective Evaluation    History of Present Illness  Mechanism of injury: States after last PT session, was unable to go to work due to leg fatigue.     No c/o with HEP, \"when I remember to do them\".    Starting to use some creme, may be CBD creme, gives some relief of medial L knee pain. .     Pain  No pain reported           Objective   See Exercise, Manual, and Modality Logs for complete treatment.       Assessment & Plan     Assessment    Assessment details: Good performance of activities today, no c/o pain , no LOB.   Good effort.     Reviewed HEP.     Goals  Plan Goals:  Short Term Goals (6 weeks):    Patient is independent with HEP each visit. ONGOING    Patient is able to sleep without being disrupted by pain.    Patient has full L knee extension, pain free. PROGRESSING    Patient has minimal to no tenderness to palpation to L lateral  knee.     Pt will tolerate 30 min - 1 hour, or more, prolonged walking and standing.       Long Term Goals (12 weeks):     Patient has functional ( -3 to 120 +/- degrees, or better) AROM/PROM of L knee, pain free.    Patient is able to return to work/community activities with minimal to no discomfort.    Patient is able stand and walk for >1.5 hours,  for work/community related tasks.    Pt will demonstrate normalized gait pattern. PROGRESSING    Pt will return to work without functional limitations.    Pt will be I in final HEP and progression parameters to perform after formal physical therapy has been " discontinued.      Plan      Plan  Plan details: Continue therapy involving :  -general conditioning/ aerobic capacity,  -B LE/ hip strengthening/ stabilization training,   -balance, proprioception, coordination,   Progress HEP                  Timed:  Manual Therapy:         mins  47140;  Therapeutic Exercise:         mins  79406;     Therapeutic Activity:     15     mins  66322;    Neuromuscular Timi:    15    mins  14509;    Gait Training:      15     mins  24957;     Ultrasound:          mins  51359;    Mechanical Traction         mins   14581     Un-timed:   Electrical Stimulation         mins  50271 ()  Traction          mins  86743    Timed Treatment:   45   mins   Total Treatment:     45   mins    Lizz Mejia, PT            Lizz Mejia, PT    Physical Therapist  [place] License [id number]    This document has been electronically signed by Lizz Mejia, PT on March 25, 2022 11:04 EDT

## 2022-04-01 ENCOUNTER — TELEPHONE (OUTPATIENT)
Dept: PHYSICAL THERAPY | Facility: OTHER | Age: 62
End: 2022-04-01

## 2022-04-06 ENCOUNTER — TREATMENT (OUTPATIENT)
Dept: PHYSICAL THERAPY | Facility: CLINIC | Age: 62
End: 2022-04-06

## 2022-04-06 DIAGNOSIS — R29.898 WEAKNESS OF LEFT HIP: ICD-10-CM

## 2022-04-06 DIAGNOSIS — R29.898 WEAKNESS OF LEFT LOWER EXTREMITY: ICD-10-CM

## 2022-04-06 DIAGNOSIS — M25.562 LEFT KNEE PAIN, UNSPECIFIED CHRONICITY: Primary | ICD-10-CM

## 2022-04-06 DIAGNOSIS — R26.89 BALANCE PROBLEM: ICD-10-CM

## 2022-04-06 PROCEDURE — 97530 THERAPEUTIC ACTIVITIES: CPT | Performed by: PHYSICAL THERAPIST

## 2022-04-06 PROCEDURE — 97112 NEUROMUSCULAR REEDUCATION: CPT | Performed by: PHYSICAL THERAPIST

## 2022-04-06 PROCEDURE — 97110 THERAPEUTIC EXERCISES: CPT | Performed by: PHYSICAL THERAPIST

## 2022-04-06 NOTE — PROGRESS NOTES
"   Physical Therapy Progress Note/ Re-Assessment        Patient: Ellie Dubois   : 1960  Referring practitioner: CORTES Leone  Date of Initial Visit: Type: THERAPY  Noted: 3/10/2022  Today's Date: 2022  Patient seen for 3 sessions           Visit Diagnoses:    ICD-10-CM ICD-9-CM   1. Left knee pain, unspecified chronicity  M25.562 719.46   2. Weakness of left lower extremity  R29.898 729.89   3. Weakness of left hip  R29.898 729.89   4. Balance problem  R26.89 781.99     Subjective Questionnaire: LEFS: 46    Clinical Progress: improved    Home Program Compliance: Yes    Treatment has included: therapeutic exercise, neuromuscular re-education, therapeutic activity, gait training and education, HEP instruction      Subjective Evaluation    History of Present Illness  Mechanism of injury: States slipped at work and twisted knee, \"seems better now\".    States will be out of town next week.     Relates therapy seems to be helping increase leg strength, and balance.     No c/o with HEP.            Objective           General Comments     Knee Comments  EVALUATION 3/10/22    L knee: ext aprox -8, flexion 100, mild c/o lateral knee     MMT: (mild guarding with L LE testing )   L hip ext 4, abd 4-, add 4, flexion 4, IR 4-, ER 4-  L knee ext 4-, fair recruitment, fair tone;  Flexion 4-       SLS fair/fair- on ea leg    RE-ASSESSMENT: 22    L knee flexion 110, ext -3  MMT: L : abd 4, add 4, IR 4, ER 4, knee ext 4,  Knee flexion 4    L SLS fair +     Pt performing better on agility/ obstacle course challenges, changing direction, less hesitation/ LOB        See Exercise, Manual, and Modality Logs for complete treatment.       Assessment & Plan     Assessment    Assessment details: Pt progressing nicely, improved LE strength, improved stamina.   Pt continues to relate instability and does not want to fall.     Pt would benefit from skilled physical  therapy intervention to address the above mentioned " deficits.         Goals  Plan Goals:  Short Term Goals (6 weeks):    Patient is independent with HEP each visit. ONGOING    Patient is able to sleep without being disrupted by pain.    Patient has full L knee extension, pain free. PROGRESSING    Patient has minimal to no tenderness to palpation to L lateral  knee.     Pt will tolerate 30 min - 1 hour, or more, prolonged walking and standing. PROGRESSING      Long Term Goals (12 weeks):     Patient has functional ( -3 to 120 +/- degrees, or better) AROM/PROM of L knee, pain free.    Patient is able to return to work/community activities with minimal to no discomfort.    Patient is able stand and walk for >1.5 hours,  for work/community related tasks.PROGRESSING    Pt will demonstrate normalized gait pattern. PROGRESSING    Pt will return to work without functional limitations.    Pt will be I in final HEP and progression parameters to perform after formal physical therapy has been discontinued.          Plan  Frequency: 2x week  Duration in weeks: 4  Plan details: Will continue therapy involving :  -general conditioning/ aerobic capacity,  -B LE/ hip strengthening/ stabilization training,   -balance, proprioception, coordination,   Progress HEP         Progress toward previous goals: Partially Met     Recommendations: Continue as planned    Timeframe: 1 month    Prognosis to achieve goals: good       Timed:  Manual Therapy:         mins  90853;  Therapeutic Exercise:    10    mins  60567;     Therapeutic Activity:     15     mins  78212;    Neuromuscular Timi:    15    mins  45337;    Gait Training:           mins  06506;     Ultrasound:          mins  72091;    Mechanical Traction         mins   99318     Un-timed:   Electrical Stimulation         mins  01271 ()  Traction          mins  00799    Timed Treatment:   40   mins   Total Treatment:     45   mins    Lizz Mejia, PT            Lizz Mejia PT    Physical Therapist  [place] License [id number]    This  document has been electronically signed by Lizz Mejia PT on April 6, 2022 10:41 EDT

## 2022-04-19 ENCOUNTER — TELEPHONE (OUTPATIENT)
Dept: PHYSICAL THERAPY | Facility: OTHER | Age: 62
End: 2022-04-19

## 2022-05-06 ENCOUNTER — DOCUMENTATION (OUTPATIENT)
Dept: PHYSICAL THERAPY | Facility: CLINIC | Age: 62
End: 2022-05-06

## 2022-05-06 NOTE — PROGRESS NOTES
Discharge Summary from Physical Therapy        Patient Information  Ellie Dubois  1960  Diagnosis/ICD - 10 Code:  No primary diagnosis found.  Referring practitioner: No ref. provider found  Date of initial visit: 5/6/2022  Today's date: 5/6/2022  Patient was seen for Visit count could not be calculated. Make sure you are using a visit which is associated with an episode. sessions      Visit Diagnoses:  No diagnosis found.      Discharge Status of Patient: See PT Note dated 4/6/22    Goals: Partially Met    Discharge Plan: Continue with current home exercise program as instructed                      PT SIGNATURE: Lizz Mejia, PT MS,PT  [place] License: [id number]    This document has been electronically signed by Lizz Mejia, PT on May 6, 2022 14:16 EDT

## 2022-05-26 ENCOUNTER — TELEPHONE (OUTPATIENT)
Dept: FAMILY MEDICINE CLINIC | Age: 62
End: 2022-05-26

## 2022-05-26 DIAGNOSIS — F17.210 CIGARETTE NICOTINE DEPENDENCE WITHOUT COMPLICATION: Primary | ICD-10-CM

## 2022-05-26 RX ORDER — NICOTINE 10 MG
1 CARTRIDGE (EA) INHALATION AS NEEDED
Qty: 168 EACH | Refills: 1 | Status: SHIPPED | OUTPATIENT
Start: 2022-05-26

## 2022-05-26 NOTE — TELEPHONE ENCOUNTER
Caller: Ellie Dubois    Relationship: Self    Best call back number: [phone]     What medication are you requesting: NICOTROL INHALER    What are your current symptoms: QUIT SMOKING    How long have you been experiencing symptoms:     Have you had these symptoms before:    [x] Yes  [] No    Have you been treated for these symptoms before:   [x] Yes  [] No    If a prescription is needed, what is your preferred pharmacy and phone number: [facility] - [place], [place] - [address]  - [phone]  - [phone]     Additional notes:

## 2022-09-23 DIAGNOSIS — E78.2 MIXED HYPERLIPIDEMIA: ICD-10-CM

## 2022-09-23 RX ORDER — SIMVASTATIN 20 MG
TABLET ORAL
Qty: 90 TABLET | Refills: 1 | Status: SHIPPED | OUTPATIENT
Start: 2022-09-23 | End: 2022-09-26 | Stop reason: SDUPTHER

## 2022-09-26 ENCOUNTER — OFFICE VISIT (OUTPATIENT)
Dept: FAMILY MEDICINE CLINIC | Age: 62
End: 2022-09-26

## 2022-09-26 VITALS
HEIGHT: 68 IN | HEART RATE: 66 BPM | WEIGHT: 222 LBS | TEMPERATURE: 98.5 F | SYSTOLIC BLOOD PRESSURE: 159 MMHG | DIASTOLIC BLOOD PRESSURE: 70 MMHG | OXYGEN SATURATION: 96 % | BODY MASS INDEX: 33.65 KG/M2

## 2022-09-26 DIAGNOSIS — E78.2 MIXED HYPERLIPIDEMIA: ICD-10-CM

## 2022-09-26 DIAGNOSIS — F41.1 GENERALIZED ANXIETY DISORDER: ICD-10-CM

## 2022-09-26 DIAGNOSIS — I10 ESSENTIAL (PRIMARY) HYPERTENSION: ICD-10-CM

## 2022-09-26 DIAGNOSIS — F17.210 CIGARETTE NICOTINE DEPENDENCE WITHOUT COMPLICATION: ICD-10-CM

## 2022-09-26 DIAGNOSIS — E11.9 TYPE 2 DIABETES MELLITUS WITHOUT COMPLICATION, WITHOUT LONG-TERM CURRENT USE OF INSULIN: Primary | ICD-10-CM

## 2022-09-26 PROCEDURE — 99214 OFFICE O/P EST MOD 30 MIN: CPT | Performed by: NURSE PRACTITIONER

## 2022-09-26 RX ORDER — METFORMIN HYDROCHLORIDE 750 MG/1
750 TABLET, EXTENDED RELEASE ORAL DAILY
Qty: 90 TABLET | Refills: 1 | Status: SHIPPED | OUTPATIENT
Start: 2022-09-26 | End: 2023-03-30 | Stop reason: SDUPTHER

## 2022-09-26 RX ORDER — LISINOPRIL 20 MG/1
20 TABLET ORAL DAILY
Qty: 90 TABLET | Refills: 1 | Status: SHIPPED | OUTPATIENT
Start: 2022-09-26 | End: 2023-03-30 | Stop reason: SDUPTHER

## 2022-09-26 RX ORDER — SIMVASTATIN 20 MG
20 TABLET ORAL DAILY
Qty: 90 TABLET | Refills: 1 | Status: SHIPPED | OUTPATIENT
Start: 2022-09-26 | End: 2023-03-30 | Stop reason: SDUPTHER

## 2022-09-26 RX ORDER — CITALOPRAM 20 MG/1
20 TABLET ORAL DAILY
Qty: 90 TABLET | Refills: 1 | Status: SHIPPED | OUTPATIENT
Start: 2022-09-26 | End: 2023-03-30 | Stop reason: SDUPTHER

## 2022-09-26 NOTE — PROGRESS NOTES
Chief Complaint  Ellie Dubois presents to [facility] for Diabetes (6 month follow up )    Subjective          History of Present Illness    Ellie is following up on type 2 diabetes today. Current meds include an oral hypoglycemic ( Glucophage XR), statin, and Ace inhibitor.  Most recent lab results include last HgbA1C 5.8. UTD on eye exam with [facility] in [place].   In regard to the hyperlipidemia, current treatment includes Zocor. Tolerating well.   She is on lisinopril for HTN. She has also been taking some OTC medications for allergies but not sure if DM. She has taken her medication this morning.   Additionally, she is following up on anxiety.  Current treatment includes Citalopram.  Doing well on current treatment.  She declines mammogram.     Review of Systems      Allergies   Allergen Reactions   • Sulfa Antibiotics Hives and Swelling      Past Medical History:   Diagnosis Date   • Allergic rhinitis, seasonal    • Anxiety disorder, unspecified    • Essential (primary) hypertension    • HL (hearing loss)    • Hyperlipidemia, unspecified    • Nicotine dependence, unspecified, uncomplicated    • Shingles     X2   • Type 2 diabetes mellitus without complication (HCC)      Current Outpatient Medications   Medication Sig Dispense Refill   • citalopram (CeleXA) 20 MG tablet Take 1 tablet by mouth Daily. 90 tablet 1   • lisinopril (PRINIVIL,ZESTRIL) 20 MG tablet Take 1 tablet by mouth Daily. 90 tablet 1   • metFORMIN ER (GLUCOPHAGE-XR) 750 MG 24 hr tablet Take 1 tablet by mouth Daily. 90 tablet 1   • nicotine (Nicotrol) 10 MG inhaler Inhale 1 puff As Needed for Smoking Cessation. Max 16 cartridges/day 168 each 1   • simvastatin (ZOCOR) 20 MG tablet Take 1 tablet by mouth Daily. 90 tablet 1     No current facility-administered medications for this visit.     Past Surgical History:   Procedure Laterality Date   • ENDOVENOUS ABLATION SAPHENOUS VEIN W/ LASER  2010   • TOTAL ABDOMINAL  "HYSTERECTOMY  1973   • WISDOM TOOTH EXTRACTION        Social History     Tobacco Use   • Smoking status: Current Every Day Smoker     Packs/day: 1.00     Years: 7.00     Pack years: 7.00     Types: Cigarettes   • Smokeless tobacco: Never Used   Vaping Use   • Vaping Use: Former   Substance Use Topics   • Alcohol use: Not Currently   • Drug use: Never     Family History   Problem Relation Age of Onset   • Other Mother         PULMONARY NODULAR AMYLOIDOSIS   • Alzheimer's disease Mother    • Other Father         CORONARY ARTERY BYPASS GRAFT   • Hypertension Father    • Diabetes Maternal Grandfather    • Cancer Paternal Grandmother      Health Maintenance Due   Topic Date Due   • MAMMOGRAM  03/14/2018   • HEMOGLOBIN A1C  09/07/2022   • URINE MICROALBUMIN  09/08/2022      Immunization History   Administered Date(s) Administered   • COVID-19 (MODERNA) 1st, 2nd, 3rd Dose Only 03/09/2021, 04/06/2021   • COVID-19 (MODERNA) BOOSTER 12/13/2021   • Td 07/25/2015        Objective     Vitals:    09/26/22 0855 09/26/22 0900   BP: 148/59 159/70   BP Location: Left arm Right arm   Patient Position: Sitting Sitting   Pulse: 65 66   Temp: 98.5 °F (36.9 °C)    TempSrc: Oral    SpO2: 96%    Weight: 101 kg (222 lb)    Height: 172.7 cm (68\")      Body mass index is 33.75 kg/m².     Physical Exam  Vitals reviewed.   Constitutional:       General: She is not in acute distress.     Appearance: Normal appearance. She is well-developed.   HENT:      Head: Normocephalic and atraumatic.   Cardiovascular:      Rate and Rhythm: Normal rate and regular rhythm.      Pulses:           Dorsalis pedis pulses are 2+ on the right side and 2+ on the left side.   Pulmonary:      Effort: Pulmonary effort is normal.      Breath sounds: Normal breath sounds.   Feet:      Right foot:      Protective Sensation: 3 sites tested. 3 sites sensed.      Skin integrity: Skin integrity normal. No ulcer or blister.      Toenail Condition: Right toenails are normal.     "  Left foot:      Protective Sensation: 3 sites tested. 3 sites sensed.      Skin integrity: Skin integrity normal. No ulcer or blister.      Toenail Condition: Left toenails are normal.      Comments: Diabetic Foot Exam Performed and Monofilament Test Performed     Neurological:      Mental Status: She is alert and oriented to person, place, and time.   Psychiatric:         Mood and Affect: Mood and affect normal.           Result Review :     The following data was reviewed by: CORTES Welsh on 09/26/2022:          Hemoglobin A1c (03/07/2022 11:34)  Lipid Panel (03/07/2022 11:34)  Comprehensive Metabolic Panel (03/07/2022 11:34)  CBC & Differential (03/07/2022 11:34)                  Assessment and Plan      Diagnoses and all orders for this visit:    1. Type 2 diabetes mellitus without complication, without long-term current use of insulin (HCC) (Primary)  Comments:  Medical condition is stable.  Continue same therapy.  Will recheck at next regular appointment.    Orders:  -     metFORMIN ER (GLUCOPHAGE-XR) 750 MG 24 hr tablet; Take 1 tablet by mouth Daily.  Dispense: 90 tablet; Refill: 1  -     Comprehensive metabolic panel; Future  -     Hemoglobin A1c; Future  -     Microalbumin / Creatinine Urine Ratio - Urine, Clean Catch; Future    2. Essential (primary) hypertension  Comments:  BP elevated today. May be due to OTC meds she is taking for allergies. Will continue to monitor and continue current medication.   Orders:  -     lisinopril (PRINIVIL,ZESTRIL) 20 MG tablet; Take 1 tablet by mouth Daily.  Dispense: 90 tablet; Refill: 1    3. Mixed hyperlipidemia  Comments:  Medical condition is stable.  Continue same therapy.  Will recheck at next regular appointment.  Orders:  -     simvastatin (ZOCOR) 20 MG tablet; Take 1 tablet by mouth Daily.  Dispense: 90 tablet; Refill: 1    4. Generalized anxiety disorder  Comments:  Medical condition is stable.  Continue same therapy.  Will recheck at next regular  appointment.  Orders:  -     citalopram (CeleXA) 20 MG tablet; Take 1 tablet by mouth Daily.  Dispense: 90 tablet; Refill: 1    5. Cigarette nicotine dependence without complication  Assessment & Plan:  Tobacco use is improving with lifestyle modifications.  Smoking cessation counseling was provided.  Tobacco use will be reassessed at the next regular appointment.              Follow Up     Return in about 6 months (around 3/26/2023) for Annual physical.

## 2022-10-10 ENCOUNTER — TELEPHONE (OUTPATIENT)
Dept: FAMILY MEDICINE CLINIC | Age: 62
End: 2022-10-10

## 2022-10-26 ENCOUNTER — LAB (OUTPATIENT)
Dept: LAB | Facility: HOSPITAL | Age: 62
End: 2022-10-26

## 2022-10-26 DIAGNOSIS — E11.9 TYPE 2 DIABETES MELLITUS WITHOUT COMPLICATION, WITHOUT LONG-TERM CURRENT USE OF INSULIN: ICD-10-CM

## 2022-10-26 LAB
ALBUMIN SERPL-MCNC: 4.3 G/DL (ref 3.5–5.2)
ALBUMIN UR-MCNC: 7.1 MG/DL
ALBUMIN/GLOB SERPL: 1.4 G/DL
ALP SERPL-CCNC: 109 U/L (ref 39–117)
ALT SERPL W P-5'-P-CCNC: 17 U/L (ref 1–33)
ANION GAP SERPL CALCULATED.3IONS-SCNC: 11.4 MMOL/L (ref 5–15)
AST SERPL-CCNC: 26 U/L (ref 1–32)
BILIRUB SERPL-MCNC: 0.3 MG/DL (ref 0–1.2)
BUN SERPL-MCNC: 7 MG/DL (ref 8–23)
BUN/CREAT SERPL: 11.1 (ref 7–25)
CALCIUM SPEC-SCNC: 9.7 MG/DL (ref 8.6–10.5)
CHLORIDE SERPL-SCNC: 97 MMOL/L (ref 98–107)
CO2 SERPL-SCNC: 28.6 MMOL/L (ref 22–29)
CREAT SERPL-MCNC: 0.63 MG/DL (ref 0.57–1)
CREAT UR-MCNC: 38 MG/DL
EGFRCR SERPLBLD CKD-EPI 2021: 99.2 ML/MIN/1.73
GLOBULIN UR ELPH-MCNC: 3.1 GM/DL
GLUCOSE SERPL-MCNC: 139 MG/DL (ref 65–99)
HBA1C MFR BLD: 6.4 % (ref 4.8–5.6)
MICROALBUMIN/CREAT UR: 186.8 MG/G
POTASSIUM SERPL-SCNC: 4.8 MMOL/L (ref 3.5–5.2)
PROT SERPL-MCNC: 7.4 G/DL (ref 6–8.5)
SODIUM SERPL-SCNC: 137 MMOL/L (ref 136–145)

## 2022-10-26 PROCEDURE — 82570 ASSAY OF URINE CREATININE: CPT

## 2022-10-26 PROCEDURE — 80053 COMPREHEN METABOLIC PANEL: CPT

## 2022-10-26 PROCEDURE — 83036 HEMOGLOBIN GLYCOSYLATED A1C: CPT

## 2022-10-26 PROCEDURE — 82043 UR ALBUMIN QUANTITATIVE: CPT

## 2022-10-26 PROCEDURE — 36415 COLL VENOUS BLD VENIPUNCTURE: CPT

## 2023-03-30 ENCOUNTER — LAB (OUTPATIENT)
Dept: LAB | Facility: HOSPITAL | Age: 63
End: 2023-03-30
Payer: MEDICAID

## 2023-03-30 ENCOUNTER — OFFICE VISIT (OUTPATIENT)
Dept: FAMILY MEDICINE CLINIC | Age: 63
End: 2023-03-30
Payer: MEDICAID

## 2023-03-30 VITALS
HEIGHT: 68 IN | TEMPERATURE: 98.1 F | BODY MASS INDEX: 34.77 KG/M2 | WEIGHT: 229.4 LBS | SYSTOLIC BLOOD PRESSURE: 155 MMHG | DIASTOLIC BLOOD PRESSURE: 65 MMHG | HEART RATE: 55 BPM

## 2023-03-30 DIAGNOSIS — E11.9 TYPE 2 DIABETES MELLITUS WITHOUT COMPLICATION, WITHOUT LONG-TERM CURRENT USE OF INSULIN: ICD-10-CM

## 2023-03-30 DIAGNOSIS — I10 ESSENTIAL (PRIMARY) HYPERTENSION: ICD-10-CM

## 2023-03-30 DIAGNOSIS — Z00.00 ANNUAL PHYSICAL EXAM: Primary | ICD-10-CM

## 2023-03-30 DIAGNOSIS — Z00.00 ANNUAL PHYSICAL EXAM: ICD-10-CM

## 2023-03-30 DIAGNOSIS — E78.2 MIXED HYPERLIPIDEMIA: ICD-10-CM

## 2023-03-30 DIAGNOSIS — F17.210 CIGARETTE NICOTINE DEPENDENCE WITHOUT COMPLICATION: ICD-10-CM

## 2023-03-30 DIAGNOSIS — F41.1 GENERALIZED ANXIETY DISORDER: ICD-10-CM

## 2023-03-30 LAB
ALBUMIN SERPL-MCNC: 4.3 G/DL (ref 3.5–5.2)
ALBUMIN/GLOB SERPL: 1.4 G/DL
ALP SERPL-CCNC: 119 U/L (ref 39–117)
ALT SERPL W P-5'-P-CCNC: 25 U/L (ref 1–33)
ANION GAP SERPL CALCULATED.3IONS-SCNC: 9.5 MMOL/L (ref 5–15)
AST SERPL-CCNC: 27 U/L (ref 1–32)
BILIRUB SERPL-MCNC: 0.4 MG/DL (ref 0–1.2)
BUN SERPL-MCNC: 10 MG/DL (ref 8–23)
BUN/CREAT SERPL: 16.4 (ref 7–25)
CALCIUM SPEC-SCNC: 9.2 MG/DL (ref 8.6–10.5)
CHLORIDE SERPL-SCNC: 97 MMOL/L (ref 98–107)
CHOLEST SERPL-MCNC: 163 MG/DL (ref 0–200)
CO2 SERPL-SCNC: 25.5 MMOL/L (ref 22–29)
CREAT SERPL-MCNC: 0.61 MG/DL (ref 0.57–1)
DEPRECATED RDW RBC AUTO: 43.2 FL (ref 37–54)
EGFRCR SERPLBLD CKD-EPI 2021: 100 ML/MIN/1.73
ERYTHROCYTE [DISTWIDTH] IN BLOOD BY AUTOMATED COUNT: 12.6 % (ref 12.3–15.4)
GLOBULIN UR ELPH-MCNC: 3.1 GM/DL
GLUCOSE SERPL-MCNC: 116 MG/DL (ref 65–99)
HBA1C MFR BLD: 6.5 % (ref 4.8–5.6)
HCT VFR BLD AUTO: 45.3 % (ref 34–46.6)
HDLC SERPL-MCNC: 58 MG/DL (ref 40–60)
HGB BLD-MCNC: 15.1 G/DL (ref 12–15.9)
LDLC SERPL CALC-MCNC: 87 MG/DL (ref 0–100)
LDLC/HDLC SERPL: 1.47 {RATIO}
MCH RBC QN AUTO: 30.9 PG (ref 26.6–33)
MCHC RBC AUTO-ENTMCNC: 33.3 G/DL (ref 31.5–35.7)
MCV RBC AUTO: 92.6 FL (ref 79–97)
PLATELET # BLD AUTO: 257 10*3/MM3 (ref 140–450)
PMV BLD AUTO: 9.7 FL (ref 6–12)
POTASSIUM SERPL-SCNC: 4.7 MMOL/L (ref 3.5–5.2)
PROT SERPL-MCNC: 7.4 G/DL (ref 6–8.5)
RBC # BLD AUTO: 4.89 10*6/MM3 (ref 3.77–5.28)
SODIUM SERPL-SCNC: 132 MMOL/L (ref 136–145)
TRIGL SERPL-MCNC: 98 MG/DL (ref 0–150)
VLDLC SERPL-MCNC: 18 MG/DL (ref 5–40)
WBC NRBC COR # BLD: 8.08 10*3/MM3 (ref 3.4–10.8)

## 2023-03-30 PROCEDURE — 36415 COLL VENOUS BLD VENIPUNCTURE: CPT

## 2023-03-30 PROCEDURE — 83036 HEMOGLOBIN GLYCOSYLATED A1C: CPT

## 2023-03-30 PROCEDURE — 80053 COMPREHEN METABOLIC PANEL: CPT

## 2023-03-30 PROCEDURE — 85027 COMPLETE CBC AUTOMATED: CPT

## 2023-03-30 PROCEDURE — 80061 LIPID PANEL: CPT

## 2023-03-30 RX ORDER — SIMVASTATIN 20 MG
20 TABLET ORAL DAILY
Qty: 90 TABLET | Refills: 1 | Status: SHIPPED | OUTPATIENT
Start: 2023-03-30

## 2023-03-30 RX ORDER — CITALOPRAM 20 MG/1
20 TABLET ORAL DAILY
Qty: 90 TABLET | Refills: 1 | Status: SHIPPED | OUTPATIENT
Start: 2023-03-30

## 2023-03-30 RX ORDER — LISINOPRIL 20 MG/1
20 TABLET ORAL DAILY
Qty: 90 TABLET | Refills: 1 | Status: SHIPPED | OUTPATIENT
Start: 2023-03-30

## 2023-03-30 RX ORDER — METFORMIN HYDROCHLORIDE 750 MG/1
750 TABLET, EXTENDED RELEASE ORAL DAILY
Qty: 90 TABLET | Refills: 1 | Status: SHIPPED | OUTPATIENT
Start: 2023-03-30

## 2023-03-30 NOTE — PROGRESS NOTES
Chief Complaint  Ellie Dubois presents to [facility] for Annual Exam    Subjective          History of Present Illness    Ellie is here today for annual preventive exam.   Has received covid vaccine X 3.  UTD Tdap vaccine.  Declines PNA, zoster, influenza vaccine.  Normal cologuard 10/12/2020.   Due for mammogram. She declines to schedule.  Pap smear no longer indicated due to history of hysterectomy.  Current daily smoker of 1 ppd.    Ellie is also following up on type 2 diabetes today. Current meds include an oral hypoglycemic ( Glucophage XR), statin, and Ace inhibitor.  Most recent lab results include last HgbA1C 6.4. Due for eye exam.   In regard to the hyperlipidemia, current treatment includes Zocor. Tolerating well.   She is on lisinopril for HTN. She has not taken her BP medication yet today. She reports BP readings have been 130s/70s at home.   Additionally, she is following up on anxiety.  Current treatment includes Citalopram.  Doing well on current treatment.    Review of Systems   Constitutional: Negative for chills and fever.   HENT: Negative for ear pain and sore throat.    Eyes: Negative for blurred vision and redness.   Respiratory: Negative for shortness of breath and wheezing.    Cardiovascular: Negative for chest pain and palpitations.   Gastrointestinal: Negative for abdominal pain, nausea and vomiting.   Genitourinary: Negative for dysuria, frequency and urgency.   Skin: Negative for rash.   Neurological: Negative for seizures and syncope.   Psychiatric/Behavioral: Negative for suicidal ideas and depressed mood.         Allergies   Allergen Reactions   • Sulfa Antibiotics Hives and Swelling      Past Medical History:   Diagnosis Date   • Allergic rhinitis, seasonal    • Anxiety disorder, unspecified    • Essential (primary) hypertension    • HL (hearing loss)    • Hyperlipidemia, unspecified    • Nicotine dependence, unspecified, uncomplicated    • Shingles      X2   • Type 2 diabetes mellitus without complication (HCC)      Current Outpatient Medications   Medication Sig Dispense Refill   • citalopram (CeleXA) 20 MG tablet Take 1 tablet by mouth Daily. 90 tablet 1   • lisinopril (PRINIVIL,ZESTRIL) 20 MG tablet Take 1 tablet by mouth Daily. 90 tablet 1   • metFORMIN ER (GLUCOPHAGE-XR) 750 MG 24 hr tablet Take 1 tablet by mouth Daily. 90 tablet 1   • nicotine (Nicotrol) 10 MG inhaler Inhale 1 puff As Needed for Smoking Cessation. Max 16 cartridges/day 168 each 1   • simvastatin (ZOCOR) 20 MG tablet Take 1 tablet by mouth Daily. 90 tablet 1     No current facility-administered medications for this visit.     Past Surgical History:   Procedure Laterality Date   • ENDOVENOUS ABLATION SAPHENOUS VEIN W/ LASER  2010   • TOTAL ABDOMINAL HYSTERECTOMY  1973   • WISDOM TOOTH EXTRACTION        Social History     Tobacco Use   • Smoking status: Every Day     Packs/day: 1.00     Years: 7.00     Pack years: 7.00     Types: Cigarettes     Passive exposure: Never   • Smokeless tobacco: Never   Vaping Use   • Vaping Use: Former   Substance Use Topics   • Alcohol use: Not Currently   • Drug use: Never     Family History   Problem Relation Age of Onset   • Other Mother         PULMONARY NODULAR AMYLOIDOSIS   • Alzheimer's disease Mother    • Other Father         CORONARY ARTERY BYPASS GRAFT   • Hypertension Father    • Diabetes Maternal Grandfather    • Cancer Paternal Grandmother      Health Maintenance Due   Topic Date Due   • MAMMOGRAM  03/14/2018   • ANNUAL PHYSICAL  03/07/2023   • LIPID PANEL  03/07/2023   • DIABETIC EYE EXAM  03/07/2023      Immunization History   Administered Date(s) Administered   • COVID-19 (MODERNA) 1st, 2nd, 3rd Dose Only 03/09/2021, 04/06/2021   • COVID-19 (MODERNA) BOOSTER 12/13/2021   • Td 07/25/2015        Objective     Vitals:    03/30/23 0919 03/30/23 0923 03/30/23 1002   BP: 166/68 170/68 155/65   BP Location: Left arm Right arm Left arm   Patient Position:  "Sitting Sitting Sitting   Cuff Size: Large Adult Large Adult    Pulse: 59 60 55   Temp: 98.1 °F (36.7 °C)     TempSrc: Oral     Weight: 104 kg (229 lb 6.4 oz)     Height: 172.7 cm (68\")       Body mass index is 34.88 kg/m².     Physical Exam  Vitals reviewed.   Constitutional:       General: She is not in acute distress.     Appearance: Normal appearance. She is well-developed.   HENT:      Head: Normocephalic and atraumatic.      Right Ear: Hearing, tympanic membrane and ear canal normal.      Left Ear: Hearing, tympanic membrane and ear canal normal.      Mouth/Throat:      Mouth: Mucous membranes are moist.   Eyes:      Extraocular Movements: Extraocular movements intact.      Pupils: Pupils are equal, round, and reactive to light.   Cardiovascular:      Rate and Rhythm: Normal rate and regular rhythm.      Pulses: Normal pulses.      Heart sounds: Normal heart sounds.   Pulmonary:      Effort: Pulmonary effort is normal.      Breath sounds: Normal breath sounds.   Abdominal:      General: Bowel sounds are normal.      Palpations: Abdomen is soft.      Tenderness: There is no abdominal tenderness.   Musculoskeletal:         General: Normal range of motion.      Cervical back: Normal range of motion and neck supple.   Skin:     General: Skin is warm and dry.   Neurological:      Mental Status: She is alert and oriented to person, place, and time.   Psychiatric:         Mood and Affect: Mood normal.           Result Review :                               Assessment and Plan      Diagnoses and all orders for this visit:    1. Annual physical exam (Primary)  Comments:  Appropriate screenings and vaccinations were reviewed with the pt and offered as indicated.  Pt counseled on healthy lifestyle including healthy diet, exercise.  Orders:  -     Comprehensive Metabolic Panel; Future  -     Lipid Panel; Future  -     CBC No Differential; Future    2. Cigarette nicotine dependence without complication  Assessment & " Plan:  Tobacco use is unchanged.  Smoking cessation counseling was provided.  Tobacco use will be reassessed at the next regular appointment.      3. Type 2 diabetes mellitus without complication, without long-term current use of insulin ([facility])  Comments:  Medical condition is stable.  Continue same therapy.  Will recheck at next regular appointment.    Orders:  -     metFORMIN ER (GLUCOPHAGE-XR) 750 MG 24 hr tablet; Take 1 tablet by mouth Daily.  Dispense: 90 tablet; Refill: 1  -     Hemoglobin A1c; Future    4. Mixed hyperlipidemia  Comments:  Medical condition is stable.  Continue same therapy.  Will recheck at next regular appointment.  Orders:  -     simvastatin (ZOCOR) 20 MG tablet; Take 1 tablet by mouth Daily.  Dispense: 90 tablet; Refill: 1    5. Essential (primary) hypertension  Comments:  BP elevated. She has not taken BP med today. Advised to return for BP check in allergy room.   Orders:  -     lisinopril (PRINIVIL,ZESTRIL) 20 MG tablet; Take 1 tablet by mouth Daily.  Dispense: 90 tablet; Refill: 1    6. Generalized anxiety disorder  Comments:  Medical condition is stable.  Continue same therapy.  Will recheck at next regular appointment.  Orders:  -     citalopram (CeleXA) 20 MG tablet; Take 1 tablet by mouth Daily.  Dispense: 90 tablet; Refill: 1            Follow Up     Return in about 5 months (around 8/30/2023).

## 2023-08-31 ENCOUNTER — OFFICE VISIT (OUTPATIENT)
Dept: FAMILY MEDICINE CLINIC | Age: 63
End: 2023-08-31
Payer: MEDICARE

## 2023-08-31 ENCOUNTER — LAB (OUTPATIENT)
Dept: LAB | Facility: HOSPITAL | Age: 63
End: 2023-08-31
Payer: MEDICARE

## 2023-08-31 ENCOUNTER — TELEPHONE (OUTPATIENT)
Dept: FAMILY MEDICINE CLINIC | Age: 63
End: 2023-08-31

## 2023-08-31 VITALS
TEMPERATURE: 97.4 F | WEIGHT: 229 LBS | HEIGHT: 68 IN | HEART RATE: 61 BPM | DIASTOLIC BLOOD PRESSURE: 76 MMHG | OXYGEN SATURATION: 100 % | SYSTOLIC BLOOD PRESSURE: 116 MMHG | BODY MASS INDEX: 34.71 KG/M2

## 2023-08-31 DIAGNOSIS — I10 ESSENTIAL (PRIMARY) HYPERTENSION: ICD-10-CM

## 2023-08-31 DIAGNOSIS — E11.9 TYPE 2 DIABETES MELLITUS WITHOUT COMPLICATION, WITHOUT LONG-TERM CURRENT USE OF INSULIN: ICD-10-CM

## 2023-08-31 DIAGNOSIS — E78.2 MIXED HYPERLIPIDEMIA: ICD-10-CM

## 2023-08-31 DIAGNOSIS — F17.210 CIGARETTE NICOTINE DEPENDENCE WITHOUT COMPLICATION: ICD-10-CM

## 2023-08-31 DIAGNOSIS — E11.9 TYPE 2 DIABETES MELLITUS WITHOUT COMPLICATION, WITHOUT LONG-TERM CURRENT USE OF INSULIN: Primary | ICD-10-CM

## 2023-08-31 DIAGNOSIS — F41.1 GENERALIZED ANXIETY DISORDER: ICD-10-CM

## 2023-08-31 LAB
ALBUMIN SERPL-MCNC: 4.3 G/DL (ref 3.5–5.2)
ALBUMIN/GLOB SERPL: 1.2 G/DL
ALP SERPL-CCNC: 121 U/L (ref 39–117)
ALT SERPL W P-5'-P-CCNC: 23 U/L (ref 1–33)
ANION GAP SERPL CALCULATED.3IONS-SCNC: 10 MMOL/L (ref 5–15)
AST SERPL-CCNC: 23 U/L (ref 1–32)
BILIRUB SERPL-MCNC: 0.4 MG/DL (ref 0–1.2)
BUN SERPL-MCNC: 11 MG/DL (ref 8–23)
BUN/CREAT SERPL: 15.1 (ref 7–25)
CALCIUM SPEC-SCNC: 9.8 MG/DL (ref 8.6–10.5)
CHLORIDE SERPL-SCNC: 94 MMOL/L (ref 98–107)
CO2 SERPL-SCNC: 26 MMOL/L (ref 22–29)
CREAT SERPL-MCNC: 0.73 MG/DL (ref 0.57–1)
EGFRCR SERPLBLD CKD-EPI 2021: 91.4 ML/MIN/1.73
GLOBULIN UR ELPH-MCNC: 3.6 GM/DL
GLUCOSE SERPL-MCNC: 127 MG/DL (ref 65–99)
HBA1C MFR BLD: 6.4 % (ref 4.8–5.6)
POTASSIUM SERPL-SCNC: 5.3 MMOL/L (ref 3.5–5.2)
PROT SERPL-MCNC: 7.9 G/DL (ref 6–8.5)
SODIUM SERPL-SCNC: 130 MMOL/L (ref 136–145)

## 2023-08-31 PROCEDURE — 83036 HEMOGLOBIN GLYCOSYLATED A1C: CPT

## 2023-08-31 PROCEDURE — 36415 COLL VENOUS BLD VENIPUNCTURE: CPT

## 2023-08-31 PROCEDURE — 80053 COMPREHEN METABOLIC PANEL: CPT

## 2023-08-31 RX ORDER — LISINOPRIL 30 MG/1
30 TABLET ORAL DAILY
Qty: 90 TABLET | Refills: 1 | Status: SHIPPED | OUTPATIENT
Start: 2023-08-31

## 2023-08-31 RX ORDER — CITALOPRAM 20 MG/1
20 TABLET ORAL DAILY
Qty: 90 TABLET | Refills: 1 | Status: SHIPPED | OUTPATIENT
Start: 2023-08-31

## 2023-08-31 RX ORDER — METFORMIN HYDROCHLORIDE 750 MG/1
750 TABLET, EXTENDED RELEASE ORAL DAILY
Qty: 90 TABLET | Refills: 1 | Status: SHIPPED | OUTPATIENT
Start: 2023-08-31

## 2023-08-31 RX ORDER — SIMVASTATIN 20 MG
20 TABLET ORAL DAILY
Qty: 90 TABLET | Refills: 1 | Status: SHIPPED | OUTPATIENT
Start: 2023-08-31

## 2023-08-31 NOTE — PROGRESS NOTES
Chief Complaint  Ellie Dubois presents to [facility] for Diabetes (Medication follow-up - DM )    Subjective          History of Present Illness    Ellie is following up on type 2 diabetes today. Current meds include an oral hypoglycemic ( Glucophage XR), statin, and Ace inhibitor.  Most recent lab results include last HgbA1C 6.5. She had eye exam last week at [facility].   In regard to the hyperlipidemia, current treatment includes Zocor. Tolerating well.   She is on lisinopril for HTN. BP elevated today and on last visit. Reports compliance with medication.   Additionally, she is following up on anxiety.  Current treatment includes Citalopram.  Doing well on current treatment.  Declines mammogram, bone density, and vaccines.     Review of Systems      Allergies   Allergen Reactions    Sulfa Antibiotics Hives and Swelling      Past Medical History:   Diagnosis Date    Allergic rhinitis, seasonal     Anxiety disorder, unspecified     Essential (primary) hypertension     HL (hearing loss)     Hyperlipidemia, unspecified     Nicotine dependence, unspecified, uncomplicated     Shingles     X2    Type 2 diabetes mellitus without complication      Current Outpatient Medications   Medication Sig Dispense Refill    citalopram (CeleXA) 20 MG tablet Take 1 tablet by mouth Daily. 90 tablet 1    lisinopril (PRINIVIL,ZESTRIL) 30 MG tablet Take 1 tablet by mouth Daily. 90 tablet 1    metFORMIN ER (GLUCOPHAGE-XR) 750 MG 24 hr tablet Take 1 tablet by mouth Daily. 90 tablet 1    nicotine (Nicotrol) 10 MG inhaler Inhale 1 puff As Needed for Smoking Cessation. Max 16 cartridges/day 168 each 1    simvastatin (ZOCOR) 20 MG tablet Take 1 tablet by mouth Daily. 90 tablet 1     No current facility-administered medications for this visit.     Past Surgical History:   Procedure Laterality Date    ENDOVENOUS ABLATION SAPHENOUS VEIN W/ LASER  2010    TOTAL ABDOMINAL HYSTERECTOMY  1973    WISDOM TOOTH  "EXTRACTION        Social History     Tobacco Use    Smoking status: Every Day     Packs/day: 1.00     Years: 7.00     Pack years: 7.00     Types: Cigarettes     Passive exposure: Never    Smokeless tobacco: Never   Vaping Use    Vaping Use: Former   Substance Use Topics    Alcohol use: Not Currently    Drug use: Never     Family History   Problem Relation Age of Onset    Other Mother         PULMONARY NODULAR AMYLOIDOSIS    Alzheimer's disease Mother     Other Father         CORONARY ARTERY BYPASS GRAFT    Hypertension Father     Diabetes Maternal Grandfather     Cancer Paternal Grandmother      Health Maintenance Due   Topic Date Due    MAMMOGRAM  03/14/2018    DIABETIC EYE EXAM  03/07/2023    BMI FOLLOWUP  03/07/2023      Immunization History   Administered Date(s) Administered    COVID-19 (MODERNA) 1st,2nd,3rd Dose Monovalent 03/09/2021, 04/06/2021    COVID-19 (MODERNA) Monovalent Original Booster 12/13/2021    Td (TDVAX) 07/25/2015        Objective     Vitals:    08/31/23 0911 08/31/23 0933   BP: 151/58 116/76   BP Location: Left arm Left arm   Patient Position: Sitting Sitting   Cuff Size: Large Adult Large Adult   Pulse: 65 61   Temp: 97.4 øF (36.3 øC)    TempSrc: Oral    SpO2: 100%    Weight: 104 kg (229 lb)    Height: 172.7 cm (67.99\")      Body mass index is 34.83 kg/mý.     BMI is >= 30 and <35. (Class 1 Obesity). The following options were offered after discussion;: weight loss educational material (shared in after visit summary)       Physical Exam  Vitals reviewed.   Constitutional:       General: She is not in acute distress.     Appearance: Normal appearance. She is well-developed.   HENT:      Head: Normocephalic and atraumatic.   Cardiovascular:      Rate and Rhythm: Normal rate and regular rhythm.   Pulmonary:      Effort: Pulmonary effort is normal.      Breath sounds: Normal breath sounds.   Neurological:      Mental Status: She is alert and oriented to person, place, and time.   Psychiatric:    "      Mood and Affect: Mood and affect normal.         Result Review :                               Assessment and Plan      Diagnoses and all orders for this visit:    1. Type 2 diabetes mellitus without complication, without long-term current use of insulin (Primary)  Comments:  Medical condition is stable.  Continue same therapy.  Will recheck at next regular appointment.    Orders:  -     metFORMIN ER (GLUCOPHAGE-XR) 750 MG 24 hr tablet; Take 1 tablet by mouth Daily.  Dispense: 90 tablet; Refill: 1  -     Comprehensive metabolic panel; Future  -     Hemoglobin A1c; Future    2. Essential (primary) hypertension  Comments:  BP elevated. Will increase Lisinopril dose to 30 mg daily.  Orders:  -     lisinopril (PRINIVIL,ZESTRIL) 30 MG tablet; Take 1 tablet by mouth Daily.  Dispense: 90 tablet; Refill: 1    3. Mixed hyperlipidemia  Comments:  Medical condition is stable.  Continue same therapy.  Will recheck at next regular appointment.  Orders:  -     simvastatin (ZOCOR) 20 MG tablet; Take 1 tablet by mouth Daily.  Dispense: 90 tablet; Refill: 1    4. Generalized anxiety disorder  Comments:  Medical condition is stable.  Continue same therapy.  Will recheck at next regular appointment.  Orders:  -     citalopram (CeleXA) 20 MG tablet; Take 1 tablet by mouth Daily.  Dispense: 90 tablet; Refill: 1    5. Cigarette nicotine dependence without complication  Assessment & Plan:  Tobacco use is unchanged.  Smoking cessation counseling was provided.  Tobacco use will be reassessed at the next regular appointment.                Follow Up     Return in about 6 months (around 2/29/2024) for Medicare Wellness.

## 2023-09-01 DIAGNOSIS — E87.1 HYPONATREMIA: Primary | ICD-10-CM

## 2023-09-05 ENCOUNTER — LAB (OUTPATIENT)
Dept: LAB | Facility: HOSPITAL | Age: 63
End: 2023-09-05
Payer: MEDICARE

## 2023-09-05 DIAGNOSIS — E87.1 HYPONATREMIA: ICD-10-CM

## 2023-09-05 LAB — OSMOLALITY SERPL: 279 MOSM/KG (ref 280–301)

## 2023-09-05 PROCEDURE — 84300 ASSAY OF URINE SODIUM: CPT

## 2023-09-05 PROCEDURE — 83935 ASSAY OF URINE OSMOLALITY: CPT

## 2023-09-05 PROCEDURE — 83930 ASSAY OF BLOOD OSMOLALITY: CPT

## 2023-09-05 PROCEDURE — 36415 COLL VENOUS BLD VENIPUNCTURE: CPT

## 2023-09-06 LAB
OSMOLALITY UR: 298 MOSM/KG
SODIUM UR-SCNC: 45 MMOL/L

## 2023-10-05 ENCOUNTER — TELEPHONE (OUTPATIENT)
Dept: FAMILY MEDICINE CLINIC | Age: 63
End: 2023-10-05
Payer: MEDICARE

## 2023-10-05 DIAGNOSIS — Z12.11 SCREENING FOR COLORECTAL CANCER: Primary | ICD-10-CM

## 2023-10-05 DIAGNOSIS — Z12.12 SCREENING FOR COLORECTAL CANCER: Primary | ICD-10-CM

## 2023-11-14 DIAGNOSIS — E78.2 MIXED HYPERLIPIDEMIA: ICD-10-CM

## 2023-11-14 RX ORDER — SIMVASTATIN 20 MG
20 TABLET ORAL DAILY
Qty: 90 TABLET | Refills: 1 | Status: SHIPPED | OUTPATIENT
Start: 2023-11-14

## 2023-12-05 DIAGNOSIS — F41.1 GENERALIZED ANXIETY DISORDER: ICD-10-CM

## 2023-12-05 RX ORDER — CITALOPRAM 20 MG/1
20 TABLET ORAL DAILY
Qty: 90 TABLET | Refills: 1 | Status: SHIPPED | OUTPATIENT
Start: 2023-12-05

## 2023-12-18 DIAGNOSIS — I10 ESSENTIAL (PRIMARY) HYPERTENSION: ICD-10-CM

## 2023-12-18 RX ORDER — LISINOPRIL 30 MG/1
30 TABLET ORAL DAILY
Qty: 90 TABLET | Refills: 1 | Status: CANCELLED | OUTPATIENT
Start: 2023-12-18

## 2023-12-18 NOTE — TELEPHONE ENCOUNTER
Caller: Ellie Dubois    Relationship: Self    Best call back number: [phone]    Requested Prescriptions:   Requested Prescriptions     Pending Prescriptions Disp Refills    lisinopril (PRINIVIL,ZESTRIL) 30 MG tablet 90 tablet 1     Sig: Take 1 tablet by mouth Daily.        Pharmacy where request should be sent: [facility] [id number] - [facility] [place] - [address] [facility] - [phone]  - [phone] FX     Last office visit with prescribing clinician: 8/31/2023   Last telemedicine visit with prescribing clinician: Visit date not found   Next office visit with prescribing clinician: 3/8/2024     Additional details provided by patient: WHEN PHARMACY REFILLED THE PRESCRIPTION ON 10/05/2023 THEY FILLED IT WITH 20 MG DOSAGE INSTEAD OF THE 30 MG AND PATIENT WILL BE OUT OF MEDICATION IN 3-4 DAYS AND IT SHOWS NOT ELIGIBLE FOR ANOTHER REFILL UNTIL MARCH 2024 A NEW PRESCRIPTION NEEDS TO BE SENT.  SHE HAS BEEN TAKING ONE AND HALF PILLS A DAY     Does the patient have less than a 3 day supply:  [] Yes  [x] No    Would you like a call back once the refill request has been completed: [] Yes [x] No    If the office needs to give you a call back, can they leave a voicemail: [] Yes [x] No    Alex Albright Rep   12/18/23 11:32 EST

## 2024-01-12 DIAGNOSIS — I10 ESSENTIAL (PRIMARY) HYPERTENSION: ICD-10-CM

## 2024-01-12 RX ORDER — LISINOPRIL 20 MG/1
20 TABLET ORAL DAILY
Qty: 90 TABLET | Refills: 1 | OUTPATIENT
Start: 2024-01-12

## 2024-03-08 ENCOUNTER — OFFICE VISIT (OUTPATIENT)
Dept: FAMILY MEDICINE CLINIC | Age: 64
End: 2024-03-08
Payer: MEDICARE

## 2024-03-08 ENCOUNTER — LAB (OUTPATIENT)
Dept: LAB | Facility: HOSPITAL | Age: 64
End: 2024-03-08
Payer: MEDICARE

## 2024-03-08 VITALS
SYSTOLIC BLOOD PRESSURE: 156 MMHG | TEMPERATURE: 98 F | DIASTOLIC BLOOD PRESSURE: 71 MMHG | WEIGHT: 234.8 LBS | HEART RATE: 58 BPM | HEIGHT: 68 IN | BODY MASS INDEX: 35.58 KG/M2 | OXYGEN SATURATION: 97 %

## 2024-03-08 DIAGNOSIS — I10 ESSENTIAL (PRIMARY) HYPERTENSION: ICD-10-CM

## 2024-03-08 DIAGNOSIS — F41.1 GENERALIZED ANXIETY DISORDER: ICD-10-CM

## 2024-03-08 DIAGNOSIS — Z53.20 MAMMOGRAM DECLINED: ICD-10-CM

## 2024-03-08 DIAGNOSIS — E78.2 MIXED HYPERLIPIDEMIA: ICD-10-CM

## 2024-03-08 DIAGNOSIS — E11.9 TYPE 2 DIABETES MELLITUS WITHOUT COMPLICATION, WITHOUT LONG-TERM CURRENT USE OF INSULIN: ICD-10-CM

## 2024-03-08 DIAGNOSIS — Z00.00 MEDICARE ANNUAL WELLNESS VISIT, SUBSEQUENT: Primary | ICD-10-CM

## 2024-03-08 DIAGNOSIS — Z00.00 MEDICARE ANNUAL WELLNESS VISIT, SUBSEQUENT: ICD-10-CM

## 2024-03-08 LAB
ALBUMIN SERPL-MCNC: 3.9 G/DL (ref 3.5–5.2)
ALBUMIN UR-MCNC: 14.1 MG/DL
ALBUMIN/GLOB SERPL: 1.2 G/DL
ALP SERPL-CCNC: 113 U/L (ref 39–117)
ALT SERPL W P-5'-P-CCNC: 24 U/L (ref 1–33)
ANION GAP SERPL CALCULATED.3IONS-SCNC: 10.6 MMOL/L (ref 5–15)
AST SERPL-CCNC: 29 U/L (ref 1–32)
BILIRUB SERPL-MCNC: 0.5 MG/DL (ref 0–1.2)
BUN SERPL-MCNC: 10 MG/DL (ref 8–23)
BUN/CREAT SERPL: 14.9 (ref 7–25)
CALCIUM SPEC-SCNC: 8.9 MG/DL (ref 8.6–10.5)
CHLORIDE SERPL-SCNC: 97 MMOL/L (ref 98–107)
CHOLEST SERPL-MCNC: 164 MG/DL (ref 0–200)
CO2 SERPL-SCNC: 26.4 MMOL/L (ref 22–29)
CREAT SERPL-MCNC: 0.67 MG/DL (ref 0.57–1)
CREAT UR-MCNC: 59.8 MG/DL
DEPRECATED RDW RBC AUTO: 43.1 FL (ref 37–54)
EGFRCR SERPLBLD CKD-EPI 2021: 97.1 ML/MIN/1.73
ERYTHROCYTE [DISTWIDTH] IN BLOOD BY AUTOMATED COUNT: 12.3 % (ref 12.3–15.4)
GLOBULIN UR ELPH-MCNC: 3.3 GM/DL
GLUCOSE SERPL-MCNC: 130 MG/DL (ref 65–99)
HBA1C MFR BLD: 6.5 % (ref 4.8–5.6)
HCT VFR BLD AUTO: 44 % (ref 34–46.6)
HDLC SERPL-MCNC: 54 MG/DL (ref 40–60)
HGB BLD-MCNC: 14.5 G/DL (ref 12–15.9)
LDLC SERPL CALC-MCNC: 94 MG/DL (ref 0–100)
LDLC/HDLC SERPL: 1.73 {RATIO}
MCH RBC QN AUTO: 30.9 PG (ref 26.6–33)
MCHC RBC AUTO-ENTMCNC: 33 G/DL (ref 31.5–35.7)
MCV RBC AUTO: 93.8 FL (ref 79–97)
MICROALBUMIN/CREAT UR: 235.8 MG/G (ref 0–29)
PLATELET # BLD AUTO: 275 10*3/MM3 (ref 140–450)
PMV BLD AUTO: 9.8 FL (ref 6–12)
POTASSIUM SERPL-SCNC: 5 MMOL/L (ref 3.5–5.2)
PROT SERPL-MCNC: 7.2 G/DL (ref 6–8.5)
RBC # BLD AUTO: 4.69 10*6/MM3 (ref 3.77–5.28)
SODIUM SERPL-SCNC: 134 MMOL/L (ref 136–145)
TRIGL SERPL-MCNC: 84 MG/DL (ref 0–150)
VLDLC SERPL-MCNC: 16 MG/DL (ref 5–40)
WBC NRBC COR # BLD AUTO: 8 10*3/MM3 (ref 3.4–10.8)

## 2024-03-08 PROCEDURE — 85027 COMPLETE CBC AUTOMATED: CPT

## 2024-03-08 PROCEDURE — 83036 HEMOGLOBIN GLYCOSYLATED A1C: CPT

## 2024-03-08 PROCEDURE — 82043 UR ALBUMIN QUANTITATIVE: CPT

## 2024-03-08 PROCEDURE — 36415 COLL VENOUS BLD VENIPUNCTURE: CPT

## 2024-03-08 PROCEDURE — 82570 ASSAY OF URINE CREATININE: CPT

## 2024-03-08 PROCEDURE — 80053 COMPREHEN METABOLIC PANEL: CPT

## 2024-03-08 PROCEDURE — 80061 LIPID PANEL: CPT

## 2024-03-08 RX ORDER — LISINOPRIL 30 MG/1
30 TABLET ORAL DAILY
Qty: 90 TABLET | Refills: 1 | Status: SHIPPED | OUTPATIENT
Start: 2024-03-08

## 2024-03-08 RX ORDER — SIMVASTATIN 20 MG
20 TABLET ORAL DAILY
Qty: 90 TABLET | Refills: 1 | Status: SHIPPED | OUTPATIENT
Start: 2024-03-08

## 2024-03-08 RX ORDER — CITALOPRAM 20 MG/1
20 TABLET ORAL DAILY
Qty: 90 TABLET | Refills: 1 | Status: SHIPPED | OUTPATIENT
Start: 2024-03-08

## 2024-03-08 RX ORDER — METFORMIN HYDROCHLORIDE 750 MG/1
750 TABLET, EXTENDED RELEASE ORAL DAILY
Qty: 90 TABLET | Refills: 1 | Status: SHIPPED | OUTPATIENT
Start: 2024-03-08

## 2024-03-08 NOTE — PROGRESS NOTES
The ABCs of the Annual Wellness Visit  Subsequent Medicare Wellness Visit    Subjective    Ellie Dubois is a 63 y.o. female who presents for a Subsequent Medicare Wellness Visit.    The following portions of the patient's history were reviewed and   updated as appropriate: allergies, current medications, past family history, past medical history, past social history, past surgical history, and problem list.    Compared to one year ago, the patient feels her physical   health is the same.    Compared to one year ago, the patient feels her mental   health is the same.    Recent Hospitalizations:  She was not admitted to the hospital during the last year.       Current Medical Providers:  Patient Care Team:  Nae Mcghee APRN as PCP - General (Nurse Practitioner)    Outpatient Medications Prior to Visit   Medication Sig Dispense Refill    nicotine (Nicotrol) 10 MG inhaler Inhale 1 puff As Needed for Smoking Cessation. Max 16 cartridges/day 168 each 1    citalopram (CeleXA) 20 MG tablet TAKE ONE TABLET BY MOUTH DAILY 90 tablet 1    lisinopril (PRINIVIL,ZESTRIL) 30 MG tablet Take 1 tablet by mouth Daily. 90 tablet 1    metFORMIN ER (GLUCOPHAGE-XR) 750 MG 24 hr tablet Take 1 tablet by mouth Daily. 90 tablet 1    simvastatin (ZOCOR) 20 MG tablet TAKE ONE TABLET BY MOUTH DAILY 90 tablet 1     No facility-administered medications prior to visit.       No opioid medication identified on active medication list. I have reviewed chart for other potential  high risk medication/s and harmful drug interactions in the elderly.        Aspirin is not on active medication list.  Aspirin use is indicated based on review of current medical condition/s. Pros and cons of this therapy have been discussed with this patient. Benefits of this medication outweigh potential harm.  Patient has been instructed to start taking this medication..    Patient Active Problem List   Diagnosis    Allergic rhinitis, seasonal    Anxiety disorder,  "unspecified    Essential (primary) hypertension    Hyperlipidemia, unspecified    Nicotine dependence, unspecified, uncomplicated    Type 2 diabetes mellitus without complication    HL (hearing loss)    Mammogram declined     Advance Care Planning   Advance Care Planning     Advance Directive is not on file.  ACP discussion was held with the patient during this visit. Patient does not have an advance directive, declines further assistance.     Objective    Vitals:    03/08/24 0811 03/08/24 0821 03/08/24 0855   BP: 155/83 149/66 156/71   BP Location: Right arm Left arm Right arm   Patient Position: Sitting Sitting Sitting   Cuff Size: Large Adult Large Adult Large Adult   Pulse: 54  58   Temp: 98 °F (36.7 °C)     TempSrc: Oral     SpO2: 97%     Weight: 107 kg (234 lb 12.8 oz)     Height: 172.7 cm (67.99\")       Estimated body mass index is 35.71 kg/m² as calculated from the following:    Height as of this encounter: 172.7 cm (67.99\").    Weight as of this encounter: 107 kg (234 lb 12.8 oz).           Does the patient have evidence of cognitive impairment? No          HEALTH RISK ASSESSMENT    Smoking Status:  Social History     Tobacco Use   Smoking Status Every Day    Current packs/day: 1.00    Average packs/day: 1 pack/day for 7.0 years (7.0 ttl pk-yrs)    Types: Cigarettes    Passive exposure: Never   Smokeless Tobacco Never     Alcohol Consumption:  Social History     Substance and Sexual Activity   Alcohol Use Not Currently     Fall Risk Screen:    NORBERTO Fall Risk Assessment was completed, and patient is at LOW risk for falls.Assessment completed on:3/8/2024    Depression Screening:      3/8/2024     8:11 AM   PHQ-2/PHQ-9 Depression Screening   Little Interest or Pleasure in Doing Things 0-->not at all   Feeling Down, Depressed or Hopeless 0-->not at all   PHQ-9: Brief Depression Severity Measure Score 0       Health Habits and Functional and Cognitive Screening:      3/8/2024     8:12 AM   Functional & " Cognitive Status   Do you have difficulty preparing food and eating? No   Do you have difficulty bathing yourself, getting dressed or grooming yourself? No   Do you have difficulty using the toilet? No   Do you have difficulty moving around from place to place? No   Do you have trouble with steps or getting out of a bed or a chair? No   Current Diet Unhealthy Diet   Dental Exam Not up to date   Eye Exam Up to date   Exercise (times per week) 0 times per week   Current Exercises Include No Regular Exercise   Do you need help using the phone?  No   Are you deaf or do you have serious difficulty hearing?  Yes   Do you need help to go to places out of walking distance? No   Do you need help shopping? No   Do you need help preparing meals?  No   Do you need help with housework?  No   Do you need help with laundry? No   Do you need help taking your medications? No   Do you need help managing money? No   Do you ever drive or ride in a car without wearing a seat belt? No   Have you felt unusual stress, anger or loneliness in the last month? No   Who do you live with? Alone   If you need help, do you have trouble finding someone available to you? No   Have you been bothered in the last four weeks by sexual problems? No   Do you have difficulty concentrating, remembering or making decisions? No       Age-appropriate Screening Schedule:  Refer to the list below for future screening recommendations based on patient's age, sex and/or medical conditions. Orders for these recommended tests are listed in the plan section. The patient has been provided with a written plan.    Health Maintenance   Topic Date Due    URINE MICROALBUMIN  10/26/2023    HEMOGLOBIN A1C  02/29/2024    DXA SCAN  03/08/2024 (Originally 1960)    ZOSTER VACCINE (1 of 2) 03/08/2024 (Originally 7/30/2008)    COVID-19 Vaccine (4 - 2023-24 season) 03/10/2024 (Originally 9/1/2023)    INFLUENZA VACCINE  03/31/2024 (Originally 8/1/2023)    Pneumococcal Vaccine  65+ (1 of 2 - PCV) 08/31/2024 (Originally 7/30/1966)    RSV Vaccine - Adults (1 - 1-dose 60+ series) 03/08/2025 (Originally 7/30/2018)    MAMMOGRAM  03/08/2025 (Originally 3/14/2018)    LIPID PANEL  03/30/2024    DIABETIC EYE EXAM  08/23/2024    BMI FOLLOWUP  08/31/2024    ANNUAL WELLNESS VISIT  03/08/2025    DIABETIC FOOT EXAM  03/08/2025    TDAP/TD VACCINES (2 - Tdap) 07/25/2025    COLORECTAL CANCER SCREENING  10/20/2026    HEPATITIS C SCREENING  Completed                  CMS Preventative Services Quick Reference  Risk Factors Identified During Encounter  Immunizations Discussed/Encouraged: Influenza, Prevnar 20 (Pneumococcal 20-valent conjugate), Shingrix, COVID19, and RSV (Respiratory Syncytial Virus)  The above risks/problems have been discussed with the patient.  Pertinent information has been shared with the patient in the After Visit Summary.  An After Visit Summary and PPPS were made available to the patient.    Follow Up:   Next Medicare Wellness visit to be scheduled in 1 year.       Additional E&M Note during same encounter follows:  Patient has multiple medical problems which are significant and separately identifiable that require additional work above and beyond the Medicare Wellness Visit.      Chief Complaint  Welcome To Medicare    Subjective        HPI  Ellie Dubois is following up on type 2 diabetes today. Current meds include an oral hypoglycemic ( Glucophage XR), statin, and Ace inhibitor.  Most recent lab results include last HgbA1C 6.4. UTD eye exam at [facility].  In regard to the hyperlipidemia, current treatment includes Zocor. Tolerating well.   She is on lisinopril for HTN.  Additionally, she is following up on anxiety.  Current treatment includes Citalopram.  Doing well on current treatment.  Declines mammogram, bone density, and vaccines.        Objective   Vital Signs:  /71 (BP Location: Right arm, Patient Position: Sitting, Cuff Size: Large Adult)   Pulse 58   Temp 98 °F  "(36.7 °C) (Oral)   Ht 172.7 cm (67.99\")   Wt 107 kg (234 lb 12.8 oz)   SpO2 97%   BMI 35.71 kg/m²     Physical Exam  Vitals reviewed.   Constitutional:       General: She is not in acute distress.     Appearance: Normal appearance. She is well-developed.   HENT:      Head: Normocephalic and atraumatic.   Cardiovascular:      Rate and Rhythm: Normal rate and regular rhythm.      Pulses:           Dorsalis pedis pulses are 2+ on the right side and 2+ on the left side.      Comments: +varicose veins  Pulmonary:      Effort: Pulmonary effort is normal.      Breath sounds: Normal breath sounds.   Feet:      Right foot:      Protective Sensation: 3 sites tested.  3 sites sensed.      Skin integrity: Skin integrity normal. No ulcer or blister.      Toenail Condition: Fungal disease present.     Left foot:      Protective Sensation: 3 sites tested.  3 sites sensed.      Skin integrity: Skin integrity normal. No ulcer or blister.      Toenail Condition: Fungal disease present.     Comments: Diabetic Foot Exam Performed and Monofilament Test Performed     Neurological:      Mental Status: She is alert and oriented to person, place, and time.   Psychiatric:         Mood and Affect: Mood and affect normal.                         Assessment and Plan   Diagnoses and all orders for this visit:    1. Medicare annual wellness visit, subsequent (Primary)  Comments:  Appropriate screenings and vaccinations were reviewed with the pt and offered as indicated.  Pt counseled on healthy lifestyle including healthy diet, exercise.  Orders:  -     Comprehensive metabolic panel; Future  -     Hemoglobin A1c; Future  -     Lipid panel; Future  -     Microalbumin / Creatinine Urine Ratio - Urine, Clean Catch; Future  -     CBC No Differential; Future    2. Type 2 diabetes mellitus without complication, without long-term current use of insulin  Comments:  Medical condition is stable.  Continue same therapy.  Will recheck at next regular " appointment.    Orders:  -     metFORMIN ER (GLUCOPHAGE-XR) 750 MG 24 hr tablet; Take 1 tablet by mouth Daily.  Dispense: 90 tablet; Refill: 1    3. Essential (primary) hypertension  Comments:  BP elevated. Will monitor at home. Let me know if remains increased. May need to increase med dose again in the future.  Orders:  -     lisinopril (PRINIVIL,ZESTRIL) 30 MG tablet; Take 1 tablet by mouth Daily.  Dispense: 90 tablet; Refill: 1    4. Mixed hyperlipidemia  Comments:  Medical condition is stable.  Continue same therapy.  Will recheck at next regular appointment.  Orders:  -     simvastatin (ZOCOR) 20 MG tablet; Take 1 tablet by mouth Daily.  Dispense: 90 tablet; Refill: 1    5. Generalized anxiety disorder  Comments:  Medical condition is stable.  Continue same therapy.  Will recheck at next regular appointment.  Orders:  -     citalopram (CeleXA) 20 MG tablet; Take 1 tablet by mouth Daily.  Dispense: 90 tablet; Refill: 1    6. Mammogram declined      Declines mammogram, bone density scan, vaccines.   Declines further intervention for varicose veins, fungal nails.            Follow Up   Return in about 6 months (around 9/8/2024) for Recheck.  Patient was given instructions and counseling regarding her condition or for health maintenance advice. Please see specific information pulled into the AVS if appropriate.

## 2024-03-28 ENCOUNTER — TELEPHONE (OUTPATIENT)
Dept: FAMILY MEDICINE CLINIC | Age: 64
End: 2024-03-28
Payer: MEDICARE

## 2024-03-28 NOTE — TELEPHONE ENCOUNTER
Caller: Ellie Dubois    Relationship to patient: Self    Best call back number: [phone]     Patient is needing: PATIENT WOULD LIKE TO BE ADVISED IF THE PAPERWORK DROPPED OFF 3.26.2024 IS READY TO BE PICKED UP.

## 2024-04-22 DIAGNOSIS — E11.9 TYPE 2 DIABETES MELLITUS WITHOUT COMPLICATION, WITHOUT LONG-TERM CURRENT USE OF INSULIN: ICD-10-CM

## 2024-04-22 RX ORDER — METFORMIN HYDROCHLORIDE 750 MG/1
750 TABLET, EXTENDED RELEASE ORAL DAILY
Qty: 90 TABLET | Refills: 1 | Status: SHIPPED | OUTPATIENT
Start: 2024-04-22

## 2024-07-31 DIAGNOSIS — E11.9 TYPE 2 DIABETES MELLITUS WITHOUT COMPLICATION, WITHOUT LONG-TERM CURRENT USE OF INSULIN: ICD-10-CM

## 2024-07-31 RX ORDER — METFORMIN HYDROCHLORIDE 750 MG/1
750 TABLET, EXTENDED RELEASE ORAL DAILY
Qty: 90 TABLET | Refills: 1 | Status: SHIPPED | OUTPATIENT
Start: 2024-07-31

## 2024-09-07 DIAGNOSIS — I10 ESSENTIAL (PRIMARY) HYPERTENSION: ICD-10-CM

## 2024-09-10 RX ORDER — LISINOPRIL 30 MG/1
30 TABLET ORAL DAILY
Qty: 90 TABLET | Refills: 0 | Status: SHIPPED | OUTPATIENT
Start: 2024-09-10 | End: 2024-09-12 | Stop reason: SDUPTHER

## 2024-09-12 ENCOUNTER — OFFICE VISIT (OUTPATIENT)
Dept: FAMILY MEDICINE CLINIC | Age: 64
End: 2024-09-12
Payer: MEDICARE

## 2024-09-12 ENCOUNTER — LAB (OUTPATIENT)
Dept: LAB | Facility: HOSPITAL | Age: 64
End: 2024-09-12
Payer: MEDICARE

## 2024-09-12 VITALS
TEMPERATURE: 98.1 F | BODY MASS INDEX: 34.13 KG/M2 | HEIGHT: 68 IN | OXYGEN SATURATION: 95 % | WEIGHT: 225.2 LBS | DIASTOLIC BLOOD PRESSURE: 62 MMHG | SYSTOLIC BLOOD PRESSURE: 132 MMHG | HEART RATE: 74 BPM

## 2024-09-12 DIAGNOSIS — F17.210 CIGARETTE NICOTINE DEPENDENCE WITHOUT COMPLICATION: ICD-10-CM

## 2024-09-12 DIAGNOSIS — E11.9 TYPE 2 DIABETES MELLITUS WITHOUT COMPLICATION, WITHOUT LONG-TERM CURRENT USE OF INSULIN: ICD-10-CM

## 2024-09-12 DIAGNOSIS — R53.83 OTHER FATIGUE: ICD-10-CM

## 2024-09-12 DIAGNOSIS — I10 ESSENTIAL (PRIMARY) HYPERTENSION: ICD-10-CM

## 2024-09-12 DIAGNOSIS — E11.9 TYPE 2 DIABETES MELLITUS WITHOUT COMPLICATION, WITHOUT LONG-TERM CURRENT USE OF INSULIN: Primary | ICD-10-CM

## 2024-09-12 DIAGNOSIS — E78.2 MIXED HYPERLIPIDEMIA: ICD-10-CM

## 2024-09-12 DIAGNOSIS — F41.1 GENERALIZED ANXIETY DISORDER: ICD-10-CM

## 2024-09-12 LAB
ALBUMIN SERPL-MCNC: 4.2 G/DL (ref 3.5–5.2)
ALBUMIN/GLOB SERPL: 1.3 G/DL
ALP SERPL-CCNC: 116 U/L (ref 39–117)
ALT SERPL W P-5'-P-CCNC: 21 U/L (ref 1–33)
ANION GAP SERPL CALCULATED.3IONS-SCNC: 9 MMOL/L (ref 5–15)
AST SERPL-CCNC: 23 U/L (ref 1–32)
BILIRUB SERPL-MCNC: 0.4 MG/DL (ref 0–1.2)
BUN SERPL-MCNC: 9 MG/DL (ref 8–23)
BUN/CREAT SERPL: 12.3 (ref 7–25)
CALCIUM SPEC-SCNC: 10.1 MG/DL (ref 8.6–10.5)
CHLORIDE SERPL-SCNC: 97 MMOL/L (ref 98–107)
CO2 SERPL-SCNC: 29 MMOL/L (ref 22–29)
CREAT SERPL-MCNC: 0.73 MG/DL (ref 0.57–1)
DEPRECATED RDW RBC AUTO: 42.9 FL (ref 37–54)
EGFRCR SERPLBLD CKD-EPI 2021: 90.8 ML/MIN/1.73
ERYTHROCYTE [DISTWIDTH] IN BLOOD BY AUTOMATED COUNT: 12.3 % (ref 12.3–15.4)
GLOBULIN UR ELPH-MCNC: 3.3 GM/DL
GLUCOSE SERPL-MCNC: 138 MG/DL (ref 65–99)
HBA1C MFR BLD: 6.5 % (ref 4.8–5.6)
HCT VFR BLD AUTO: 45.1 % (ref 34–46.6)
HGB BLD-MCNC: 14.5 G/DL (ref 12–15.9)
MCH RBC QN AUTO: 30.1 PG (ref 26.6–33)
MCHC RBC AUTO-ENTMCNC: 32.2 G/DL (ref 31.5–35.7)
MCV RBC AUTO: 93.6 FL (ref 79–97)
PLATELET # BLD AUTO: 299 10*3/MM3 (ref 140–450)
PMV BLD AUTO: 10.2 FL (ref 6–12)
POTASSIUM SERPL-SCNC: 5.5 MMOL/L (ref 3.5–5.2)
PROT SERPL-MCNC: 7.5 G/DL (ref 6–8.5)
RBC # BLD AUTO: 4.82 10*6/MM3 (ref 3.77–5.28)
SODIUM SERPL-SCNC: 135 MMOL/L (ref 136–145)
TSH SERPL DL<=0.05 MIU/L-ACNC: 0.84 UIU/ML (ref 0.27–4.2)
VIT B12 BLD-MCNC: 425 PG/ML (ref 211–946)
WBC NRBC COR # BLD AUTO: 7.67 10*3/MM3 (ref 3.4–10.8)

## 2024-09-12 PROCEDURE — 82607 VITAMIN B-12: CPT

## 2024-09-12 PROCEDURE — 83036 HEMOGLOBIN GLYCOSYLATED A1C: CPT

## 2024-09-12 PROCEDURE — 99214 OFFICE O/P EST MOD 30 MIN: CPT | Performed by: NURSE PRACTITIONER

## 2024-09-12 PROCEDURE — 3075F SYST BP GE 130 - 139MM HG: CPT | Performed by: NURSE PRACTITIONER

## 2024-09-12 PROCEDURE — 36415 COLL VENOUS BLD VENIPUNCTURE: CPT

## 2024-09-12 PROCEDURE — G2211 COMPLEX E/M VISIT ADD ON: HCPCS | Performed by: NURSE PRACTITIONER

## 2024-09-12 PROCEDURE — 1159F MED LIST DOCD IN RCRD: CPT | Performed by: NURSE PRACTITIONER

## 2024-09-12 PROCEDURE — 84443 ASSAY THYROID STIM HORMONE: CPT

## 2024-09-12 PROCEDURE — 3078F DIAST BP <80 MM HG: CPT | Performed by: NURSE PRACTITIONER

## 2024-09-12 PROCEDURE — 1160F RVW MEDS BY RX/DR IN RCRD: CPT | Performed by: NURSE PRACTITIONER

## 2024-09-12 PROCEDURE — 85027 COMPLETE CBC AUTOMATED: CPT

## 2024-09-12 PROCEDURE — 80053 COMPREHEN METABOLIC PANEL: CPT

## 2024-09-12 PROCEDURE — 3044F HG A1C LEVEL LT 7.0%: CPT | Performed by: NURSE PRACTITIONER

## 2024-09-12 RX ORDER — SIMVASTATIN 20 MG
20 TABLET ORAL DAILY
Qty: 90 TABLET | Refills: 1 | Status: SHIPPED | OUTPATIENT
Start: 2024-09-12

## 2024-09-12 RX ORDER — METFORMIN HYDROCHLORIDE 750 MG/1
750 TABLET, EXTENDED RELEASE ORAL DAILY
Qty: 90 TABLET | Refills: 1 | Status: SHIPPED | OUTPATIENT
Start: 2024-09-12

## 2024-09-12 RX ORDER — CITALOPRAM HYDROBROMIDE 20 MG/1
20 TABLET ORAL DAILY
Qty: 90 TABLET | Refills: 1 | Status: SHIPPED | OUTPATIENT
Start: 2024-09-12

## 2024-09-12 RX ORDER — LISINOPRIL 30 MG/1
30 TABLET ORAL DAILY
Qty: 90 TABLET | Refills: 1 | Status: SHIPPED | OUTPATIENT
Start: 2024-09-12

## 2024-09-12 NOTE — PROGRESS NOTES
Chief Complaint  Ellie Dubois presents to [facility] for Diabetes    Subjective          History of Present Illness    Ellie is here today to follow up on type 2 diabetes today. Current meds include an oral hypoglycemic ( Glucophage XR), statin, and Ace inhibitor. Last lab results with A1C at 6.5.  Due for eye exam.   In regard to the hyperlipidemia, current treatment includes Zocor. Tolerating well.   She is on lisinopril for HTN.  Additionally, she is following up on anxiety.  Current treatment includes Citalopram.  Doing well on current treatment.  Declines mammogram, bone density, and vaccines.   C/o fatigue. Has been told that she snores.     Review of Systems      Allergies   Allergen Reactions    Sulfa Antibiotics Hives and Swelling      Past Medical History:   Diagnosis Date    Allergic rhinitis, seasonal     Anxiety disorder, unspecified     Essential (primary) hypertension     HL (hearing loss)     Hyperlipidemia, unspecified     Nicotine dependence, unspecified, uncomplicated     Shingles     X2    Type 2 diabetes mellitus without complication      Current Outpatient Medications   Medication Sig Dispense Refill    citalopram (CeleXA) 20 MG tablet Take 1 tablet by mouth Daily. 90 tablet 1    lisinopril (PRINIVIL,ZESTRIL) 30 MG tablet Take 1 tablet by mouth Daily. 90 tablet 1    metFORMIN ER (GLUCOPHAGE-XR) 750 MG 24 hr tablet Take 1 tablet by mouth Daily. 90 tablet 1    nicotine (Nicotrol) 10 MG inhaler Inhale 1 puff As Needed for Smoking Cessation. Max 16 cartridges/day 168 each 1    simvastatin (ZOCOR) 20 MG tablet Take 1 tablet by mouth Daily. 90 tablet 1     No current facility-administered medications for this visit.     Past Surgical History:   Procedure Laterality Date    ENDOVENOUS ABLATION SAPHENOUS VEIN W/ LASER  2010    TOTAL ABDOMINAL HYSTERECTOMY  1973    WISDOM TOOTH EXTRACTION        Social History     Tobacco Use    Smoking status: Every Day     Current  "packs/day: 1.00     Average packs/day: 1 pack/day for 7.0 years (7.0 ttl pk-yrs)     Types: Cigarettes     Passive exposure: Never    Smokeless tobacco: Never   Vaping Use    Vaping status: Former   Substance Use Topics    Alcohol use: Not Currently    Drug use: Never     Family History   Problem Relation Age of Onset    Other Mother         PULMONARY NODULAR AMYLOIDOSIS    Alzheimer's disease Mother     Other Father         CORONARY ARTERY BYPASS GRAFT    Hypertension Father     Diabetes Maternal Grandfather     Cancer Paternal Grandmother      Health Maintenance Due   Topic Date Due    DIABETIC EYE EXAM  08/23/2024    BMI FOLLOWUP  08/31/2024    HEMOGLOBIN A1C  09/08/2024      Immunization History   Administered Date(s) Administered    COVID-19 (MODERNA) 1st,2nd,3rd Dose Monovalent 03/09/2021, 04/06/2021    COVID-19 (MODERNA) Monovalent Original Booster 12/13/2021    Td (TDVAX) 07/25/2015        Objective     Vitals:    09/12/24 0909   BP: 132/62   Pulse: 74   Temp: 98.1 °F (36.7 °C)   TempSrc: Oral   SpO2: 95%   Weight: 102 kg (225 lb 3.2 oz)   Height: 172.7 cm (67.99\")     Body mass index is 34.25 kg/m².     BMI is >= 30 and <35. (Class 1 Obesity). The following options were offered after discussion;: exercise counseling/recommendations and nutrition counseling/recommendations            No results found.    Physical Exam  Vitals reviewed.   Constitutional:       General: She is not in acute distress.     Appearance: Normal appearance. She is well-developed.   HENT:      Head: Normocephalic and atraumatic.   Cardiovascular:      Rate and Rhythm: Normal rate and regular rhythm.   Pulmonary:      Effort: Pulmonary effort is normal.      Breath sounds: Normal breath sounds.   Neurological:      Mental Status: She is alert and oriented to person, place, and time.   Psychiatric:         Mood and Affect: Mood and affect normal.           Result Review :                               Assessment and Plan      Assessment " & Plan  Type 2 diabetes mellitus without complication, without long-term current use of insulin    Medical condition is stable.  Continue same therapy.  Will recheck at next regular appointment  Will call to schedule diabetic eye exam.   Essential (primary) hypertension  Hypertension is stable and controlled  Continue current treatment regimen.  Blood pressure will be reassessed in 6 months.  Mixed hyperlipidemia     Medical condition is stable.  Continue same therapy.  Will recheck at next regular appointment    Generalized anxiety disorder    Medical condition is stable.  Continue same therapy.  Will recheck at next regular appointment    Other fatigue  Checking labs. Consider sleep study.   Cigarette nicotine dependence without complication    Patient has been educated on the risk of diseases from using tobacco products such as cancer, COPD, and heart disease and has been advised to quit. I spent less than 3 minutes counseling the patient.        Orders Placed This Encounter   Procedures    Comprehensive metabolic panel    Hemoglobin A1c    CBC No Differential    Vitamin B12    TSH     New Medications Ordered This Visit   Medications    metFORMIN ER (GLUCOPHAGE-XR) 750 MG 24 hr tablet     Sig: Take 1 tablet by mouth Daily.     Dispense:  90 tablet     Refill:  1    lisinopril (PRINIVIL,ZESTRIL) 30 MG tablet     Sig: Take 1 tablet by mouth Daily.     Dispense:  90 tablet     Refill:  1    simvastatin (ZOCOR) 20 MG tablet     Sig: Take 1 tablet by mouth Daily.     Dispense:  90 tablet     Refill:  1    citalopram (CeleXA) 20 MG tablet     Sig: Take 1 tablet by mouth Daily.     Dispense:  90 tablet     Refill:  1                 Follow Up     Return in about 6 months (around 3/12/2025) for Medicare Wellness.

## 2024-09-12 NOTE — ASSESSMENT & PLAN NOTE
Medical condition is stable.  Continue same therapy.  Will recheck at next regular appointment  Will call to schedule diabetic eye exam.

## 2024-12-04 DIAGNOSIS — E78.2 MIXED HYPERLIPIDEMIA: ICD-10-CM

## 2024-12-04 RX ORDER — SIMVASTATIN 20 MG
20 TABLET ORAL DAILY
Qty: 90 TABLET | Refills: 1 | OUTPATIENT
Start: 2024-12-04

## 2025-05-06 ENCOUNTER — OFFICE VISIT (OUTPATIENT)
Dept: FAMILY MEDICINE CLINIC | Age: 65
End: 2025-05-06
Payer: MEDICARE

## 2025-05-06 VITALS
HEIGHT: 68 IN | BODY MASS INDEX: 32.61 KG/M2 | WEIGHT: 215.2 LBS | OXYGEN SATURATION: 98 % | DIASTOLIC BLOOD PRESSURE: 67 MMHG | TEMPERATURE: 98.2 F | HEART RATE: 84 BPM | SYSTOLIC BLOOD PRESSURE: 119 MMHG

## 2025-05-06 DIAGNOSIS — E11.9 TYPE 2 DIABETES MELLITUS WITHOUT COMPLICATION, WITHOUT LONG-TERM CURRENT USE OF INSULIN: ICD-10-CM

## 2025-05-06 DIAGNOSIS — F17.210 CIGARETTE NICOTINE DEPENDENCE WITHOUT COMPLICATION: ICD-10-CM

## 2025-05-06 DIAGNOSIS — Z53.20 MAMMOGRAM DECLINED: ICD-10-CM

## 2025-05-06 DIAGNOSIS — Z28.21 VACCINATION DECLINED: ICD-10-CM

## 2025-05-06 DIAGNOSIS — E78.2 MIXED HYPERLIPIDEMIA: ICD-10-CM

## 2025-05-06 DIAGNOSIS — Z00.00 MEDICARE ANNUAL WELLNESS VISIT, SUBSEQUENT: Primary | ICD-10-CM

## 2025-05-06 DIAGNOSIS — F41.1 GENERALIZED ANXIETY DISORDER: ICD-10-CM

## 2025-05-06 DIAGNOSIS — I10 ESSENTIAL (PRIMARY) HYPERTENSION: ICD-10-CM

## 2025-05-06 RX ORDER — CITALOPRAM HYDROBROMIDE 20 MG/1
20 TABLET ORAL DAILY
Qty: 90 TABLET | Refills: 1 | Status: SHIPPED | OUTPATIENT
Start: 2025-05-06

## 2025-05-06 RX ORDER — METFORMIN HYDROCHLORIDE 750 MG/1
750 TABLET, EXTENDED RELEASE ORAL DAILY
Qty: 90 TABLET | Refills: 1 | OUTPATIENT
Start: 2025-05-06

## 2025-05-06 RX ORDER — ASPIRIN 81 MG/1
81 TABLET ORAL DAILY
COMMUNITY

## 2025-05-06 RX ORDER — LISINOPRIL 30 MG/1
30 TABLET ORAL DAILY
Qty: 90 TABLET | Refills: 1 | Status: SHIPPED | OUTPATIENT
Start: 2025-05-06

## 2025-05-06 RX ORDER — METFORMIN HYDROCHLORIDE 750 MG/1
750 TABLET, EXTENDED RELEASE ORAL DAILY
Qty: 90 TABLET | Refills: 1 | Status: SHIPPED | OUTPATIENT
Start: 2025-05-06

## 2025-05-06 RX ORDER — SIMVASTATIN 20 MG
20 TABLET ORAL DAILY
Qty: 90 TABLET | Refills: 1 | Status: SHIPPED | OUTPATIENT
Start: 2025-05-06

## 2025-05-06 NOTE — ASSESSMENT & PLAN NOTE
Medical condition is stable.  Continue same therapy.  Will recheck at next regular appointment      Orders:    lisinopril (PRINIVIL,ZESTRIL) 30 MG tablet; Take 1 tablet by mouth Daily.

## 2025-05-06 NOTE — PROGRESS NOTES
Subjective   The ABCs of the Annual Wellness Visit  Medicare Wellness Visit      Ellie Dubois is a 64 y.o. patient who presents for a Medicare Wellness Visit.    The following portions of the patient's history were reviewed and   updated as appropriate: allergies, current medications, past family history, past medical history, past social history, past surgical history, and problem list.    Compared to one year ago, the patient's physical   health is the same.  Compared to one year ago, the patient's mental   health is the same.    Recent Hospitalizations:  She was not admitted to the hospital during the last year.     Current Medical Providers:  Patient Care Team:  Nae Mcghee APRN as PCP - General (Nurse Practitioner)    Outpatient Medications Prior to Visit   Medication Sig Dispense Refill    aspirin 81 MG EC tablet Take 1 tablet by mouth Daily.      nicotine (Nicotrol) 10 MG inhaler Inhale 1 puff As Needed for Smoking Cessation. Max 16 cartridges/day 168 each 1    citalopram (CeleXA) 20 MG tablet Take 1 tablet by mouth Daily. 90 tablet 1    lisinopril (PRINIVIL,ZESTRIL) 30 MG tablet Take 1 tablet by mouth Daily. 90 tablet 1    metFORMIN ER (GLUCOPHAGE-XR) 750 MG 24 hr tablet Take 1 tablet by mouth Daily. 90 tablet 1    simvastatin (ZOCOR) 20 MG tablet Take 1 tablet by mouth Daily. 90 tablet 1     No facility-administered medications prior to visit.     No opioid medication identified on active medication list. I have reviewed chart for other potential  high risk medication/s and harmful drug interactions in the elderly.      Aspirin is on active medication list.      Patient Active Problem List   Diagnosis    Allergic rhinitis, seasonal    Anxiety disorder, unspecified    Essential (primary) hypertension    Hyperlipidemia, unspecified    Nicotine dependence, unspecified, uncomplicated    Type 2 diabetes mellitus without complication    HL (hearing loss)    Mammogram declined    Vaccination declined  "    Advance Care Planning Advance Directive is not on file.  ACP discussion was held with the patient during this visit. Patient has an advance directive (not in EMR), copy requested.            Objective   Vitals:    25 1422   BP: 119/67   Pulse: 84   Temp: 98.2 °F (36.8 °C)   TempSrc: Oral   SpO2: 98%   Weight: 97.6 kg (215 lb 3.2 oz)   Height: 172.7 cm (67.99\")   PainSc: 5    PainLoc: Face  Comment: from  last Wednesday       Estimated body mass index is 32.73 kg/m² as calculated from the following:    Height as of this encounter: 172.7 cm (67.99\").    Weight as of this encounter: 97.6 kg (215 lb 3.2 oz).                Does the patient have evidence of cognitive impairment? No                                                                                                Health  Risk Assessment    Smoking Status:  Social History     Tobacco Use   Smoking Status Every Day    Current packs/day: 1.00    Average packs/day: 1 pack/day for 47.3 years (47.3 ttl pk-yrs)    Types: Cigarettes    Start date:     Last attempt to quit:     Passive exposure: Never   Smokeless Tobacco Never     Alcohol Consumption:  Social History     Substance and Sexual Activity   Alcohol Use Not Currently       Fall Risk Screen  STEADI Fall Risk Assessment was completed, and patient is at HIGH risk for falls. Assessment completed on:2025    Depression Screening   Little interest or pleasure in doing things? Not at all   Feeling down, depressed, or hopeless? Not at all   PHQ-2 Total Score 0      Health Habits and Functional and Cognitive Screenin/6/2025     2:25 PM   Functional & Cognitive Status   Do you have difficulty preparing food and eating? No   Do you have difficulty bathing yourself, getting dressed or grooming yourself? No   Do you have difficulty using the toilet? No   Do you have difficulty moving around from place to place? No   Do you have trouble with steps or getting out of a bed or a chair? No "   Current Diet Well Balanced Diet   Dental Exam Up to date   Eye Exam Not up to date   Exercise (times per week) 0 times per week   Current Exercises Include No Regular Exercise   Do you need help using the phone?  No   Are you deaf or do you have serious difficulty hearing?  Yes   Do you need help to go to places out of walking distance? No   Do you need help shopping? No   Do you need help preparing meals?  No   Do you need help with housework?  No   Do you need help with laundry? No   Do you need help taking your medications? No   Do you need help managing money? No   Do you ever drive or ride in a car without wearing a seat belt? No   Have you felt unusual stress, anger or loneliness in the last month? No   Who do you live with? Alone   If you need help, do you have trouble finding someone available to you? No   Have you been bothered in the last four weeks by sexual problems? No   Do you have difficulty concentrating, remembering or making decisions? No           Age-appropriate Screening Schedule:  Refer to the list below for future screening recommendations based on patient's age, sex and/or medical conditions. Orders for these recommended tests are listed in the plan section. The patient has been provided with a written plan.    Health Maintenance List  Health Maintenance   Topic Date Due    LUNG CANCER SCREENING  Never done    MAMMOGRAM  03/14/2018    DIABETIC EYE EXAM  08/23/2024    LIPID PANEL  03/08/2025    URINE MICROALBUMIN-CREATININE RATIO (uACR)  03/08/2025    HEMOGLOBIN A1C  03/12/2025    DXA SCAN  05/06/2025 (Originally 1960)    Pneumococcal Vaccine 50+ (1 of 2 - PCV) 09/12/2025 (Originally 7/30/1979)    ZOSTER VACCINE (1 of 2) 11/02/2025 (Originally 7/30/2008)    COVID-19 Vaccine (4 - 2024-25 season) 11/06/2025 (Originally 9/1/2024)    INFLUENZA VACCINE  07/01/2025    TDAP/TD VACCINES (2 - Tdap) 07/25/2025    ANNUAL WELLNESS VISIT  05/06/2026    DIABETIC FOOT EXAM  05/06/2026    COLORECTAL  CANCER SCREENING  10/20/2026    HEPATITIS C SCREENING  Completed                                                                                                                                                CMS Preventative Services Quick Reference  Risk Factors Identified During Encounter  Fall Risk-High or Moderate: Discussed Fall Prevention in the home  Hearing Problem:  Declines further intervention  Immunizations Discussed/Encouraged: Prevnar 20 (Pneumococcal 20-valent conjugate) and Shingrix  Tobacco Use/Dependance Risk (use dotphrase .tobaccocessation for documentation)    The above risks/problems have been discussed with the patient.  Pertinent information has been shared with the patient in the After Visit Summary.  An After Visit Summary and PPPS were made available to the patient.    Follow Up:   Next Medicare Wellness visit to be scheduled in 1 year.         Additional E&M Note during same encounter follows:  Patient has additional, significant, and separately identifiable condition(s)/problem(s) that require work above and beyond the Medicare Wellness Visit     Chief Complaint  Medicare Wellness-subsequent    Subjective   HPI  Ellie is also being seen today for additional medical problem/s.  She is also following up on type 2 diabetes today. Current meds include an oral hypoglycemic ( Glucophage XR), statin, and Ace inhibitor. Last A1C 6.5. Due for eye exam.   In regard to the hyperlipidemia, current treatment includes Zocor. Tolerating well.   She is on lisinopril for HTN. Stable.   Additionally, she is following up on anxiety.  Current treatment includes Citalopram.  Doing well on current treatment.  Declines mammogram, bone density scan, and vaccines.   She fell last week after slipping at [employer]. Has bruising on her chin from fall. Some right wrist pain as well but that has improved. Denies need for further intervention at this time.           Objective   Vital Signs:  /67   Pulse 84  "  Temp 98.2 °F (36.8 °C) (Oral)   Ht 172.7 cm (67.99\")   Wt 97.6 kg (215 lb 3.2 oz)   SpO2 98%   BMI 32.73 kg/m²     Physical Exam  Vitals reviewed.   Constitutional:       General: She is not in acute distress.     Appearance: Normal appearance. She is well-developed.   HENT:      Head: Normocephalic and atraumatic.   Cardiovascular:      Rate and Rhythm: Normal rate and regular rhythm.      Pulses:           Dorsalis pedis pulses are 2+ on the right side and 2+ on the left side.   Pulmonary:      Effort: Pulmonary effort is normal.      Breath sounds: Normal breath sounds.   Feet:      Right foot:      Protective Sensation: 3 sites tested.  3 sites sensed.      Skin integrity: Skin integrity normal. No ulcer or blister.      Toenail Condition: Right toenails are abnormally thick.      Left foot:      Protective Sensation: 3 sites tested.  3 sites sensed.      Skin integrity: Skin integrity normal. No ulcer or blister.      Toenail Condition: Left toenails are abnormally thick.      Comments: Diabetic Foot Exam Performed and Monofilament Test Performed     Neurological:      Mental Status: She is alert and oriented to person, place, and time.   Psychiatric:         Mood and Affect: Mood and affect normal.                    Assessment and Plan      Medicare annual wellness visit, subsequent  Appropriate screenings and vaccinations were reviewed with the pt and offered as indicated.  Pt counseled on healthy lifestyle including healthy diet, exercise.         Mammogram declined         Vaccination declined         Cigarette nicotine dependence without complication  Ellie Dubois  reports that she has been smoking cigarettes. She started smoking about 53 years ago. She has a 47.3 pack-year smoking history. She has never been exposed to tobacco smoke. She has never used smokeless tobacco. I have educated her on the risk of diseases from using tobacco products such as cancer and COPD. I have advised her to quit. "   CT chest low dose screening ordered      Orders:     CT Chest Low Dose Cancer Screening WO; Future    Type 2 diabetes mellitus without complication, without long-term current use of insulin  Medical condition is stable.  Continue same therapy.  Will recheck at next regular appointment  She will schedule eye exam.     Orders:    metFORMIN ER (GLUCOPHAGE-XR) 750 MG 24 hr tablet; Take 1 tablet by mouth Daily.    Comprehensive Metabolic Panel; Future    Lipid Panel; Future    Hemoglobin A1c; Future    Microalbumin / Creatinine Urine Ratio - Urine, Clean Catch; Future    CBC No Differential; Future    Mixed hyperlipidemia   Medical condition is stable.  Continue same therapy.  Will recheck at next regular appointment      Orders:    simvastatin (ZOCOR) 20 MG tablet; Take 1 tablet by mouth Daily.    Essential (primary) hypertension  Medical condition is stable.  Continue same therapy.  Will recheck at next regular appointment      Orders:    lisinopril (PRINIVIL,ZESTRIL) 30 MG tablet; Take 1 tablet by mouth Daily.    Generalized anxiety disorder  Medical condition is stable.  Continue same therapy.  Will recheck at next regular appointment      Orders:    citalopram (CeleXA) 20 MG tablet; Take 1 tablet by mouth Daily.            Follow Up   Return in about 6 months (around 11/6/2025) for Recheck.  Patient was given instructions and counseling regarding her condition or for health maintenance advice. Please see specific information pulled into the AVS if appropriate.

## 2025-05-06 NOTE — ASSESSMENT & PLAN NOTE
Medical condition is stable.  Continue same therapy.  Will recheck at next regular appointment      Orders:    citalopram (CeleXA) 20 MG tablet; Take 1 tablet by mouth Daily.

## 2025-05-06 NOTE — ASSESSMENT & PLAN NOTE
Medical condition is stable.  Continue same therapy.  Will recheck at next regular appointment  She will schedule eye exam.     Orders:    metFORMIN ER (GLUCOPHAGE-XR) 750 MG 24 hr tablet; Take 1 tablet by mouth Daily.    Comprehensive Metabolic Panel; Future    Lipid Panel; Future    Hemoglobin A1c; Future    Microalbumin / Creatinine Urine Ratio - Urine, Clean Catch; Future    CBC No Differential; Future

## 2025-05-06 NOTE — ASSESSMENT & PLAN NOTE
Medical condition is stable.  Continue same therapy.  Will recheck at next regular appointment      Orders:    simvastatin (ZOCOR) 20 MG tablet; Take 1 tablet by mouth Daily.

## 2025-05-06 NOTE — ASSESSMENT & PLAN NOTE
Ellie Dubois  reports that she has been smoking cigarettes. She started smoking about 53 years ago. She has a 47.3 pack-year smoking history. She has never been exposed to tobacco smoke. She has never used smokeless tobacco. I have educated her on the risk of diseases from using tobacco products such as cancer and COPD. I have advised her to quit.   CT chest low dose screening ordered      Orders:     CT Chest Low Dose Cancer Screening WO; Future

## 2025-05-14 ENCOUNTER — LAB (OUTPATIENT)
Dept: LAB | Facility: HOSPITAL | Age: 65
End: 2025-05-14
Payer: MEDICARE

## 2025-05-14 DIAGNOSIS — E11.9 TYPE 2 DIABETES MELLITUS WITHOUT COMPLICATION, WITHOUT LONG-TERM CURRENT USE OF INSULIN: ICD-10-CM

## 2025-05-14 LAB
ALBUMIN SERPL-MCNC: 4.1 G/DL (ref 3.5–5.2)
ALBUMIN UR-MCNC: 19.3 MG/DL
ALBUMIN/GLOB SERPL: 1.1 G/DL
ALP SERPL-CCNC: 129 U/L (ref 39–117)
ALT SERPL W P-5'-P-CCNC: 24 U/L (ref 1–33)
ANION GAP SERPL CALCULATED.3IONS-SCNC: 14.8 MMOL/L (ref 5–15)
AST SERPL-CCNC: 28 U/L (ref 1–32)
BILIRUB SERPL-MCNC: 0.5 MG/DL (ref 0–1.2)
BUN SERPL-MCNC: 10 MG/DL (ref 8–23)
BUN/CREAT SERPL: 11.9 (ref 7–25)
CALCIUM SPEC-SCNC: 9.6 MG/DL (ref 8.6–10.5)
CHLORIDE SERPL-SCNC: 90 MMOL/L (ref 98–107)
CHOLEST SERPL-MCNC: 163 MG/DL (ref 0–200)
CO2 SERPL-SCNC: 23.2 MMOL/L (ref 22–29)
CREAT SERPL-MCNC: 0.84 MG/DL (ref 0.57–1)
CREAT UR-MCNC: 62.3 MG/DL
DEPRECATED RDW RBC AUTO: 41.8 FL (ref 37–54)
EGFRCR SERPLBLD CKD-EPI 2021: 76.8 ML/MIN/1.73
ERYTHROCYTE [DISTWIDTH] IN BLOOD BY AUTOMATED COUNT: 12 % (ref 12.3–15.4)
GLOBULIN UR ELPH-MCNC: 3.9 GM/DL
GLUCOSE SERPL-MCNC: 112 MG/DL (ref 65–99)
HBA1C MFR BLD: 6.5 % (ref 4.8–5.6)
HCT VFR BLD AUTO: 43.5 % (ref 34–46.6)
HDLC SERPL-MCNC: 63 MG/DL (ref 40–60)
HGB BLD-MCNC: 14.4 G/DL (ref 12–15.9)
LDLC SERPL CALC-MCNC: 85 MG/DL (ref 0–100)
LDLC/HDLC SERPL: 1.33 {RATIO}
MCH RBC QN AUTO: 30.3 PG (ref 26.6–33)
MCHC RBC AUTO-ENTMCNC: 33.1 G/DL (ref 31.5–35.7)
MCV RBC AUTO: 91.6 FL (ref 79–97)
MICROALBUMIN/CREAT UR: 309.8 MG/G (ref 0–29)
PLATELET # BLD AUTO: 321 10*3/MM3 (ref 140–450)
PMV BLD AUTO: 10.1 FL (ref 6–12)
POTASSIUM SERPL-SCNC: 4.9 MMOL/L (ref 3.5–5.2)
PROT SERPL-MCNC: 8 G/DL (ref 6–8.5)
RBC # BLD AUTO: 4.75 10*6/MM3 (ref 3.77–5.28)
SODIUM SERPL-SCNC: 128 MMOL/L (ref 136–145)
TRIGL SERPL-MCNC: 80 MG/DL (ref 0–150)
VLDLC SERPL-MCNC: 15 MG/DL (ref 5–40)
WBC NRBC COR # BLD AUTO: 7.86 10*3/MM3 (ref 3.4–10.8)

## 2025-05-14 PROCEDURE — 82570 ASSAY OF URINE CREATININE: CPT

## 2025-05-14 PROCEDURE — 36415 COLL VENOUS BLD VENIPUNCTURE: CPT

## 2025-05-14 PROCEDURE — 80061 LIPID PANEL: CPT

## 2025-05-14 PROCEDURE — 80053 COMPREHEN METABOLIC PANEL: CPT

## 2025-05-14 PROCEDURE — 82043 UR ALBUMIN QUANTITATIVE: CPT

## 2025-05-14 PROCEDURE — 85027 COMPLETE CBC AUTOMATED: CPT

## 2025-05-14 PROCEDURE — 83036 HEMOGLOBIN GLYCOSYLATED A1C: CPT

## 2025-05-16 ENCOUNTER — RESULTS FOLLOW-UP (OUTPATIENT)
Dept: FAMILY MEDICINE CLINIC | Age: 65
End: 2025-05-16
Payer: MEDICARE

## 2025-05-21 DIAGNOSIS — E87.1 HYPONATREMIA: Primary | ICD-10-CM

## 2025-06-06 ENCOUNTER — TELEPHONE (OUTPATIENT)
Dept: FAMILY MEDICINE CLINIC | Age: 65
End: 2025-06-06
Payer: MEDICARE

## 2025-06-12 ENCOUNTER — HOSPITAL ENCOUNTER (OUTPATIENT)
Dept: CT IMAGING | Facility: HOSPITAL | Age: 65
Discharge: HOME | End: 2025-06-12
Admitting: NURSE PRACTITIONER
Payer: MEDICARE

## 2025-06-12 DIAGNOSIS — F17.210 CIGARETTE NICOTINE DEPENDENCE WITHOUT COMPLICATION: ICD-10-CM

## 2025-06-12 LAB
ANION GAP SERPL CALCULATED.3IONS-SCNC: 11 MMOL/L (ref 5–15)
BUN SERPL-MCNC: 9 MG/DL (ref 8–23)
BUN/CREAT SERPL: 11 (ref 7–25)
CALCIUM SPEC-SCNC: 9.6 MG/DL (ref 8.6–10.5)
CHLORIDE SERPL-SCNC: 98 MMOL/L (ref 98–107)
CO2 SERPL-SCNC: 27 MMOL/L (ref 22–29)
CREAT SERPL-MCNC: 0.82 MG/DL (ref 0.57–1)
EGFRCR SERPLBLD CKD-EPI 2021: 79 ML/MIN/1.73
GLUCOSE SERPL-MCNC: 120 MG/DL (ref 65–99)
OSMOLALITY UR: 322 MOSM/KG (ref 50–1400)
POTASSIUM SERPL-SCNC: 4.7 MMOL/L (ref 3.5–5.2)
SODIUM SERPL-SCNC: 136 MMOL/L (ref 136–145)
SODIUM UR-SCNC: 58 MMOL/L

## 2025-06-12 PROCEDURE — 83935 ASSAY OF URINE OSMOLALITY: CPT | Performed by: NURSE PRACTITIONER

## 2025-06-12 PROCEDURE — 84300 ASSAY OF URINE SODIUM: CPT | Performed by: NURSE PRACTITIONER

## 2025-06-12 PROCEDURE — 84165 PROTEIN E-PHORESIS SERUM: CPT | Performed by: NURSE PRACTITIONER

## 2025-06-12 PROCEDURE — 84155 ASSAY OF PROTEIN SERUM: CPT | Performed by: NURSE PRACTITIONER

## 2025-06-12 PROCEDURE — 80048 BASIC METABOLIC PNL TOTAL CA: CPT | Performed by: NURSE PRACTITIONER

## 2025-06-12 PROCEDURE — 84166 PROTEIN E-PHORESIS/URINE/CSF: CPT | Performed by: NURSE PRACTITIONER

## 2025-06-12 PROCEDURE — 71271 CT THORAX LUNG CANCER SCR C-: CPT

## 2025-06-12 PROCEDURE — 84156 ASSAY OF PROTEIN URINE: CPT | Performed by: NURSE PRACTITIONER

## 2025-06-13 LAB
ALBUMIN SERPL ELPH-MCNC: 3.6 G/DL (ref 2.9–4.4)
ALBUMIN/GLOB SERPL: 0.9 {RATIO} (ref 0.7–1.7)
ALPHA1 GLOB SERPL ELPH-MCNC: 0.3 G/DL (ref 0–0.4)
ALPHA2 GLOB SERPL ELPH-MCNC: 1 G/DL (ref 0.4–1)
B-GLOBULIN SERPL ELPH-MCNC: 1.1 G/DL (ref 0.7–1.3)
GAMMA GLOB SERPL ELPH-MCNC: 1.6 G/DL (ref 0.4–1.8)
GLOBULIN SER CALC-MCNC: 3.9 G/DL (ref 2.2–3.9)
LABORATORY COMMENT REPORT: NORMAL
M PROTEIN SERPL ELPH-MCNC: NORMAL G/DL
PROT PATTERN SERPL ELPH-IMP: NORMAL
PROT SERPL-MCNC: 7.5 G/DL (ref 6–8.5)

## 2025-06-16 LAB
ALBUMIN MFR UR ELPH: 69 %
ALPHA1 GLOB MFR UR ELPH: 4.2 %
ALPHA2 GLOB MFR UR ELPH: 4.4 %
B-GLOBULIN MFR UR ELPH: 12.9 %
GAMMA GLOB MFR UR ELPH: 9.5 %
LABORATORY COMMENT REPORT: NORMAL
M PROTEIN MFR UR ELPH: NORMAL %
PROT UR-MCNC: 25.4 MG/DL

## 2025-06-17 ENCOUNTER — RESULTS FOLLOW-UP (OUTPATIENT)
Dept: FAMILY MEDICINE CLINIC | Age: 65
End: 2025-06-17
Payer: MEDICARE

## 2025-08-19 DIAGNOSIS — E11.9 TYPE 2 DIABETES MELLITUS WITHOUT COMPLICATION, WITHOUT LONG-TERM CURRENT USE OF INSULIN: ICD-10-CM

## 2025-08-19 RX ORDER — METFORMIN HYDROCHLORIDE 750 MG/1
750 TABLET, EXTENDED RELEASE ORAL DAILY
Qty: 90 TABLET | Refills: 0 | Status: SHIPPED | OUTPATIENT
Start: 2025-08-19